# Patient Record
Sex: FEMALE | Race: WHITE | Employment: OTHER | URBAN - METROPOLITAN AREA
[De-identification: names, ages, dates, MRNs, and addresses within clinical notes are randomized per-mention and may not be internally consistent; named-entity substitution may affect disease eponyms.]

---

## 2017-01-10 ENCOUNTER — OFFICE VISIT (OUTPATIENT)
Dept: DERMATOLOGY | Age: 62
End: 2017-01-10

## 2017-01-10 ENCOUNTER — TELEPHONE (OUTPATIENT)
Dept: DERMATOLOGY | Age: 62
End: 2017-01-10

## 2017-01-10 DIAGNOSIS — L57.0 AK (ACTINIC KERATOSIS): Primary | ICD-10-CM

## 2017-01-10 DIAGNOSIS — L82.1 SK (SEBORRHEIC KERATOSIS): ICD-10-CM

## 2017-01-10 PROCEDURE — 17004 DESTROY PREMAL LESIONS 15/>: CPT | Performed by: DERMATOLOGY

## 2017-01-10 PROCEDURE — 99202 OFFICE O/P NEW SF 15 MIN: CPT | Performed by: DERMATOLOGY

## 2017-01-10 RX ORDER — FLUOROURACIL 50 MG/G
CREAM TOPICAL
Qty: 40 G | Refills: 0 | Status: SHIPPED | OUTPATIENT
Start: 2017-01-10 | End: 2017-03-24

## 2017-02-20 ENCOUNTER — TELEPHONE (OUTPATIENT)
Dept: FAMILY MEDICINE CLINIC | Age: 62
End: 2017-02-20

## 2017-02-20 DIAGNOSIS — R79.89 ELEVATED LFTS: ICD-10-CM

## 2017-02-20 DIAGNOSIS — I10 ESSENTIAL HYPERTENSION: Primary | ICD-10-CM

## 2017-02-20 DIAGNOSIS — Z00.00 EXAMINATION, MEDICAL, GENERAL: ICD-10-CM

## 2017-03-24 ENCOUNTER — OFFICE VISIT (OUTPATIENT)
Dept: FAMILY MEDICINE CLINIC | Age: 62
End: 2017-03-24

## 2017-03-24 VITALS
WEIGHT: 194.25 LBS | RESPIRATION RATE: 16 BRPM | DIASTOLIC BLOOD PRESSURE: 70 MMHG | OXYGEN SATURATION: 97 % | BODY MASS INDEX: 31.12 KG/M2 | SYSTOLIC BLOOD PRESSURE: 110 MMHG | HEART RATE: 86 BPM

## 2017-03-24 DIAGNOSIS — F63.0: ICD-10-CM

## 2017-03-24 DIAGNOSIS — F33.42 RECURRENT MAJOR DEPRESSIVE DISORDER, IN FULL REMISSION (HCC): ICD-10-CM

## 2017-03-24 DIAGNOSIS — J01.90 ACUTE NON-RECURRENT SINUSITIS, UNSPECIFIED LOCATION: ICD-10-CM

## 2017-03-24 DIAGNOSIS — I10 ESSENTIAL HYPERTENSION: Primary | ICD-10-CM

## 2017-03-24 DIAGNOSIS — F10.20 ALCOHOLISM (HCC): ICD-10-CM

## 2017-03-24 DIAGNOSIS — R73.9 HYPERGLYCEMIA: ICD-10-CM

## 2017-03-24 PROBLEM — F32.5 MAJOR DEPRESSIVE DISORDER IN FULL REMISSION (HCC): Status: ACTIVE | Noted: 2017-03-24

## 2017-03-24 PROCEDURE — 99214 OFFICE O/P EST MOD 30 MIN: CPT | Performed by: FAMILY MEDICINE

## 2017-03-24 RX ORDER — FLUOROURACIL 50 MG/G
CREAM TOPICAL
Qty: 40 G | Refills: 0 | Status: CANCELLED | OUTPATIENT
Start: 2017-03-24

## 2017-03-24 RX ORDER — AMLODIPINE BESYLATE 5 MG/1
5 TABLET ORAL DAILY
Qty: 90 TABLET | Refills: 3 | Status: SHIPPED | OUTPATIENT
Start: 2017-03-24 | End: 2019-02-18 | Stop reason: SDUPTHER

## 2017-03-24 RX ORDER — AMOXICILLIN 500 MG/1
500 CAPSULE ORAL 3 TIMES DAILY
Qty: 30 CAPSULE | Refills: 0 | Status: SHIPPED | OUTPATIENT
Start: 2017-03-24 | End: 2017-04-03

## 2017-03-24 RX ORDER — VENLAFAXINE HYDROCHLORIDE 150 MG/1
CAPSULE, EXTENDED RELEASE ORAL
Qty: 180 CAPSULE | Refills: 3 | Status: SHIPPED | OUTPATIENT
Start: 2017-03-24 | End: 2019-01-15 | Stop reason: ALTCHOICE

## 2017-03-24 RX ORDER — OMEPRAZOLE 40 MG/1
40 CAPSULE, DELAYED RELEASE ORAL DAILY
Qty: 90 CAPSULE | Refills: 3 | Status: SHIPPED | OUTPATIENT
Start: 2017-03-24 | End: 2019-02-18 | Stop reason: SDUPTHER

## 2017-03-24 ASSESSMENT — ENCOUNTER SYMPTOMS
COUGH: 1
DIARRHEA: 0
SHORTNESS OF BREATH: 0
VOICE CHANGE: 1
CONSTIPATION: 0
BACK PAIN: 0
ABDOMINAL PAIN: 0

## 2017-04-13 ENCOUNTER — OFFICE VISIT (OUTPATIENT)
Dept: DERMATOLOGY | Age: 62
End: 2017-04-13

## 2017-04-13 DIAGNOSIS — L71.9 ROSACEA: ICD-10-CM

## 2017-04-13 DIAGNOSIS — L57.0 AK (ACTINIC KERATOSIS): Primary | ICD-10-CM

## 2017-04-13 PROCEDURE — 17003 DESTRUCT PREMALG LES 2-14: CPT | Performed by: DERMATOLOGY

## 2017-04-13 PROCEDURE — 17000 DESTRUCT PREMALG LESION: CPT | Performed by: DERMATOLOGY

## 2017-04-13 PROCEDURE — 99213 OFFICE O/P EST LOW 20 MIN: CPT | Performed by: DERMATOLOGY

## 2017-06-02 ENCOUNTER — OFFICE VISIT (OUTPATIENT)
Dept: FAMILY MEDICINE CLINIC | Age: 62
End: 2017-06-02

## 2017-06-02 VITALS
TEMPERATURE: 99.6 F | HEART RATE: 92 BPM | BODY MASS INDEX: 31.14 KG/M2 | OXYGEN SATURATION: 98 % | DIASTOLIC BLOOD PRESSURE: 84 MMHG | SYSTOLIC BLOOD PRESSURE: 126 MMHG | WEIGHT: 194.4 LBS

## 2017-06-02 DIAGNOSIS — R05.3 CHRONIC COUGH: Primary | ICD-10-CM

## 2017-06-02 PROCEDURE — 99213 OFFICE O/P EST LOW 20 MIN: CPT | Performed by: PHYSICIAN ASSISTANT

## 2017-06-02 RX ORDER — LEVOFLOXACIN 500 MG/1
500 TABLET, FILM COATED ORAL DAILY
Qty: 10 TABLET | Refills: 0 | Status: SHIPPED | OUTPATIENT
Start: 2017-06-02 | End: 2017-06-09 | Stop reason: SDUPTHER

## 2017-06-02 ASSESSMENT — ENCOUNTER SYMPTOMS
WHEEZING: 0
DIARRHEA: 0
NAUSEA: 0
SHORTNESS OF BREATH: 0
SINUS PRESSURE: 0
TROUBLE SWALLOWING: 0
VOMITING: 0
SORE THROAT: 1
RHINORRHEA: 0
COUGH: 1

## 2017-06-09 ENCOUNTER — TELEPHONE (OUTPATIENT)
Dept: FAMILY MEDICINE CLINIC | Age: 62
End: 2017-06-09

## 2017-06-09 DIAGNOSIS — R05.3 CHRONIC COUGH: ICD-10-CM

## 2017-06-09 RX ORDER — LEVOFLOXACIN 500 MG/1
500 TABLET, FILM COATED ORAL DAILY
Qty: 4 TABLET | Refills: 0 | Status: SHIPPED | OUTPATIENT
Start: 2017-06-09 | End: 2017-06-13

## 2017-06-13 ENCOUNTER — HOSPITAL ENCOUNTER (OUTPATIENT)
Dept: OTHER | Age: 62
Discharge: HOME | End: 2017-06-13
Attending: FAMILY MEDICINE | Admitting: FAMILY MEDICINE

## 2017-06-13 DIAGNOSIS — R05.3 CHRONIC COUGH: ICD-10-CM

## 2017-12-15 ENCOUNTER — TELEPHONE (OUTPATIENT)
Dept: OTHER | Facility: CLINIC | Age: 62
End: 2017-12-15

## 2018-01-10 ENCOUNTER — OFFICE VISIT (OUTPATIENT)
Dept: DERMATOLOGY | Age: 63
End: 2018-01-10

## 2018-01-10 DIAGNOSIS — L57.0 AK (ACTINIC KERATOSIS): Primary | ICD-10-CM

## 2018-01-10 PROCEDURE — 17003 DESTRUCT PREMALG LES 2-14: CPT | Performed by: DERMATOLOGY

## 2018-01-10 PROCEDURE — 17000 DESTRUCT PREMALG LESION: CPT | Performed by: DERMATOLOGY

## 2018-01-23 ENCOUNTER — TELEPHONE (OUTPATIENT)
Dept: FAMILY MEDICINE CLINIC | Age: 63
End: 2018-01-23

## 2018-01-23 DIAGNOSIS — I10 ESSENTIAL HYPERTENSION: Primary | ICD-10-CM

## 2018-01-23 DIAGNOSIS — R73.9 HYPERGLYCEMIA: ICD-10-CM

## 2018-01-23 DIAGNOSIS — E78.00 ELEVATED CHOLESTEROL: ICD-10-CM

## 2018-05-04 ENCOUNTER — TELEPHONE (OUTPATIENT)
Dept: FAMILY MEDICINE CLINIC | Age: 63
End: 2018-05-04

## 2019-01-09 ENCOUNTER — OFFICE VISIT (OUTPATIENT)
Dept: DERMATOLOGY | Age: 64
End: 2019-01-09
Payer: MEDICARE

## 2019-01-09 DIAGNOSIS — L57.0 AK (ACTINIC KERATOSIS): Primary | ICD-10-CM

## 2019-01-09 PROCEDURE — 17003 DESTRUCT PREMALG LES 2-14: CPT | Performed by: DERMATOLOGY

## 2019-01-09 PROCEDURE — 1036F TOBACCO NON-USER: CPT | Performed by: DERMATOLOGY

## 2019-01-09 PROCEDURE — G8421 BMI NOT CALCULATED: HCPCS | Performed by: DERMATOLOGY

## 2019-01-09 PROCEDURE — 17000 DESTRUCT PREMALG LESION: CPT | Performed by: DERMATOLOGY

## 2019-01-09 PROCEDURE — 99213 OFFICE O/P EST LOW 20 MIN: CPT | Performed by: DERMATOLOGY

## 2019-01-09 PROCEDURE — G8484 FLU IMMUNIZE NO ADMIN: HCPCS | Performed by: DERMATOLOGY

## 2019-01-09 PROCEDURE — G8427 DOCREV CUR MEDS BY ELIG CLIN: HCPCS | Performed by: DERMATOLOGY

## 2019-01-09 PROCEDURE — 3017F COLORECTAL CA SCREEN DOC REV: CPT | Performed by: DERMATOLOGY

## 2019-01-15 ENCOUNTER — OFFICE VISIT (OUTPATIENT)
Dept: INTERNAL MEDICINE CLINIC | Age: 64
End: 2019-01-15
Payer: MEDICARE

## 2019-01-15 VITALS
BODY MASS INDEX: 34.02 KG/M2 | HEIGHT: 65 IN | RESPIRATION RATE: 14 BRPM | WEIGHT: 204.2 LBS | SYSTOLIC BLOOD PRESSURE: 130 MMHG | DIASTOLIC BLOOD PRESSURE: 74 MMHG | HEART RATE: 88 BPM

## 2019-01-15 DIAGNOSIS — J30.89 ENVIRONMENTAL AND SEASONAL ALLERGIES: ICD-10-CM

## 2019-01-15 DIAGNOSIS — F51.04 PSYCHOPHYSIOLOGICAL INSOMNIA: ICD-10-CM

## 2019-01-15 DIAGNOSIS — K27.9 PUD (PEPTIC ULCER DISEASE): Chronic | ICD-10-CM

## 2019-01-15 DIAGNOSIS — Z87.09 HISTORY OF ASTHMA: ICD-10-CM

## 2019-01-15 DIAGNOSIS — I10 ESSENTIAL HYPERTENSION: ICD-10-CM

## 2019-01-15 DIAGNOSIS — R05.3 PERSISTENT COUGH: ICD-10-CM

## 2019-01-15 DIAGNOSIS — Z13.31 POSITIVE DEPRESSION SCREENING: ICD-10-CM

## 2019-01-15 DIAGNOSIS — F43.21 GRIEF: ICD-10-CM

## 2019-01-15 DIAGNOSIS — F10.20 ALCOHOLISM (HCC): ICD-10-CM

## 2019-01-15 DIAGNOSIS — F33.41 MAJOR DEPRESSIVE DISORDER, RECURRENT EPISODE, IN PARTIAL REMISSION (HCC): Primary | ICD-10-CM

## 2019-01-15 DIAGNOSIS — F63.0: ICD-10-CM

## 2019-01-15 PROCEDURE — G8431 POS CLIN DEPRES SCRN F/U DOC: HCPCS | Performed by: INTERNAL MEDICINE

## 2019-01-15 PROCEDURE — G8417 CALC BMI ABV UP PARAM F/U: HCPCS | Performed by: INTERNAL MEDICINE

## 2019-01-15 PROCEDURE — 1036F TOBACCO NON-USER: CPT | Performed by: INTERNAL MEDICINE

## 2019-01-15 PROCEDURE — 3017F COLORECTAL CA SCREEN DOC REV: CPT | Performed by: INTERNAL MEDICINE

## 2019-01-15 PROCEDURE — G8427 DOCREV CUR MEDS BY ELIG CLIN: HCPCS | Performed by: INTERNAL MEDICINE

## 2019-01-15 PROCEDURE — 99203 OFFICE O/P NEW LOW 30 MIN: CPT | Performed by: INTERNAL MEDICINE

## 2019-01-15 PROCEDURE — G0444 DEPRESSION SCREEN ANNUAL: HCPCS | Performed by: INTERNAL MEDICINE

## 2019-01-15 PROCEDURE — G8484 FLU IMMUNIZE NO ADMIN: HCPCS | Performed by: INTERNAL MEDICINE

## 2019-01-15 RX ORDER — CETIRIZINE HYDROCHLORIDE 10 MG/1
10 TABLET ORAL DAILY
Qty: 30 TABLET | Refills: 5 | Status: SHIPPED | OUTPATIENT
Start: 2019-01-15 | End: 2019-02-18 | Stop reason: SDUPTHER

## 2019-01-15 RX ORDER — ZOLPIDEM TARTRATE 10 MG/1
10 TABLET ORAL NIGHTLY PRN
COMMUNITY
End: 2019-07-10 | Stop reason: CLARIF

## 2019-01-15 RX ORDER — DULOXETIN HYDROCHLORIDE 30 MG/1
60 CAPSULE, DELAYED RELEASE ORAL DAILY
Qty: 30 CAPSULE | Refills: 1 | Status: SHIPPED | OUTPATIENT
Start: 2019-01-15 | End: 2019-01-15 | Stop reason: SDUPTHER

## 2019-01-15 RX ORDER — DULOXETIN HYDROCHLORIDE 60 MG/1
60 CAPSULE, DELAYED RELEASE ORAL DAILY
Qty: 30 CAPSULE | Refills: 1 | Status: SHIPPED | OUTPATIENT
Start: 2019-01-15 | End: 2019-01-23 | Stop reason: SDUPTHER

## 2019-01-15 RX ORDER — DULOXETIN HYDROCHLORIDE 30 MG/1
30 CAPSULE, DELAYED RELEASE ORAL DAILY
COMMUNITY
End: 2019-01-15 | Stop reason: SDUPTHER

## 2019-01-15 RX ORDER — ALBUTEROL SULFATE 90 UG/1
2 AEROSOL, METERED RESPIRATORY (INHALATION) 4 TIMES DAILY PRN
Qty: 1 INHALER | Refills: 0 | Status: SHIPPED | OUTPATIENT
Start: 2019-01-15

## 2019-01-15 RX ORDER — FLUTICASONE PROPIONATE 50 MCG
2 SPRAY, SUSPENSION (ML) NASAL DAILY
Qty: 1 BOTTLE | Refills: 2 | Status: SHIPPED | OUTPATIENT
Start: 2019-01-15 | End: 2019-02-18 | Stop reason: SDUPTHER

## 2019-01-15 ASSESSMENT — PATIENT HEALTH QUESTIONNAIRE - PHQ9
SUM OF ALL RESPONSES TO PHQ QUESTIONS 1-9: 18
10. IF YOU CHECKED OFF ANY PROBLEMS, HOW DIFFICULT HAVE THESE PROBLEMS MADE IT FOR YOU TO DO YOUR WORK, TAKE CARE OF THINGS AT HOME, OR GET ALONG WITH OTHER PEOPLE: 1
SUM OF ALL RESPONSES TO PHQ9 QUESTIONS 1 & 2: 6
6. FEELING BAD ABOUT YOURSELF - OR THAT YOU ARE A FAILURE OR HAVE LET YOURSELF OR YOUR FAMILY DOWN: 2
1. LITTLE INTEREST OR PLEASURE IN DOING THINGS: 3
5. POOR APPETITE OR OVEREATING: 0
SUM OF ALL RESPONSES TO PHQ QUESTIONS 1-9: 18
9. THOUGHTS THAT YOU WOULD BE BETTER OFF DEAD, OR OF HURTING YOURSELF: 2
3. TROUBLE FALLING OR STAYING ASLEEP: 3
2. FEELING DOWN, DEPRESSED OR HOPELESS: 3
7. TROUBLE CONCENTRATING ON THINGS, SUCH AS READING THE NEWSPAPER OR WATCHING TELEVISION: 1
4. FEELING TIRED OR HAVING LITTLE ENERGY: 1
8. MOVING OR SPEAKING SO SLOWLY THAT OTHER PEOPLE COULD HAVE NOTICED. OR THE OPPOSITE, BEING SO FIGETY OR RESTLESS THAT YOU HAVE BEEN MOVING AROUND A LOT MORE THAN USUAL: 3

## 2019-01-15 ASSESSMENT — ENCOUNTER SYMPTOMS
CONSTIPATION: 0
SHORTNESS OF BREATH: 0
CHEST TIGHTNESS: 0
WHEEZING: 0
DIARRHEA: 0
COUGH: 1

## 2019-01-23 ENCOUNTER — OFFICE VISIT (OUTPATIENT)
Dept: INTERNAL MEDICINE CLINIC | Age: 64
End: 2019-01-23
Payer: MEDICARE

## 2019-01-23 VITALS
HEART RATE: 84 BPM | WEIGHT: 206.4 LBS | SYSTOLIC BLOOD PRESSURE: 124 MMHG | BODY MASS INDEX: 34.08 KG/M2 | DIASTOLIC BLOOD PRESSURE: 68 MMHG | RESPIRATION RATE: 12 BRPM

## 2019-01-23 DIAGNOSIS — Z78.9 ALCOHOL USE: ICD-10-CM

## 2019-01-23 DIAGNOSIS — Z01.419 WOMEN'S ANNUAL ROUTINE GYNECOLOGICAL EXAMINATION: Primary | ICD-10-CM

## 2019-01-23 DIAGNOSIS — N95.2 ATROPHIC VULVOVAGINITIS: ICD-10-CM

## 2019-01-23 DIAGNOSIS — R05.3 PERSISTENT COUGH: ICD-10-CM

## 2019-01-23 DIAGNOSIS — F33.41 MAJOR DEPRESSIVE DISORDER, RECURRENT EPISODE, IN PARTIAL REMISSION (HCC): ICD-10-CM

## 2019-01-23 DIAGNOSIS — I10 ESSENTIAL HYPERTENSION: ICD-10-CM

## 2019-01-23 DIAGNOSIS — E55.9 VITAMIN D INSUFFICIENCY: ICD-10-CM

## 2019-01-23 DIAGNOSIS — Z12.11 ENCOUNTER FOR SCREENING FECAL OCCULT BLOOD TESTING: ICD-10-CM

## 2019-01-23 LAB
HEMOCCULT STL QL: NEGATIVE
HEMOCCULT STL QL: NEGATIVE
HEMOCCULT STL QL: NORMAL

## 2019-01-23 PROCEDURE — 99396 PREV VISIT EST AGE 40-64: CPT | Performed by: INTERNAL MEDICINE

## 2019-01-23 PROCEDURE — G8484 FLU IMMUNIZE NO ADMIN: HCPCS | Performed by: INTERNAL MEDICINE

## 2019-01-23 PROCEDURE — 82270 OCCULT BLOOD FECES: CPT | Performed by: INTERNAL MEDICINE

## 2019-01-23 RX ORDER — DULOXETIN HYDROCHLORIDE 60 MG/1
60 CAPSULE, DELAYED RELEASE ORAL DAILY
Qty: 30 CAPSULE | Refills: 5 | Status: SHIPPED | OUTPATIENT
Start: 2019-01-23 | End: 2019-02-18 | Stop reason: SDUPTHER

## 2019-01-23 ASSESSMENT — ENCOUNTER SYMPTOMS
WHEEZING: 0
DIARRHEA: 0
VOMITING: 0
ABDOMINAL PAIN: 0
NAUSEA: 0
CONSTIPATION: 0
SHORTNESS OF BREATH: 0
BLOOD IN STOOL: 0
CHEST TIGHTNESS: 0

## 2019-01-28 ENCOUNTER — HOSPITAL ENCOUNTER (OUTPATIENT)
Dept: GENERAL RADIOLOGY | Age: 64
Discharge: HOME | End: 2019-01-28
Payer: MEDICARE

## 2019-01-28 ENCOUNTER — HOSPITAL ENCOUNTER (OUTPATIENT)
Age: 64
Discharge: HOME | End: 2019-01-28
Payer: MEDICARE

## 2019-01-28 DIAGNOSIS — R05.3 PERSISTENT COUGH: ICD-10-CM

## 2019-01-28 PROCEDURE — 71046 X-RAY EXAM CHEST 2 VIEWS: CPT

## 2019-01-30 ENCOUNTER — TELEPHONE (OUTPATIENT)
Dept: INTERNAL MEDICINE CLINIC | Age: 64
End: 2019-01-30

## 2019-01-30 DIAGNOSIS — N95.2 ATROPHIC VULVOVAGINITIS: ICD-10-CM

## 2019-01-31 RX ORDER — CLOBETASOL PROPIONATE 0.5 MG/G
CREAM TOPICAL
Qty: 30 G | Refills: 1 | Status: SHIPPED | OUTPATIENT
Start: 2019-01-31 | End: 2019-07-10 | Stop reason: CLARIF

## 2019-02-01 ENCOUNTER — TELEPHONE (OUTPATIENT)
Dept: INTERNAL MEDICINE CLINIC | Age: 64
End: 2019-02-01

## 2019-02-02 PROBLEM — Z01.419 WOMEN'S ANNUAL ROUTINE GYNECOLOGICAL EXAMINATION: Status: ACTIVE | Noted: 2019-02-02

## 2019-02-18 ENCOUNTER — TELEPHONE (OUTPATIENT)
Dept: INTERNAL MEDICINE CLINIC | Age: 64
End: 2019-02-18

## 2019-02-18 DIAGNOSIS — F33.41 MAJOR DEPRESSIVE DISORDER, RECURRENT EPISODE, IN PARTIAL REMISSION (HCC): ICD-10-CM

## 2019-02-18 DIAGNOSIS — J30.89 ENVIRONMENTAL AND SEASONAL ALLERGIES: ICD-10-CM

## 2019-02-18 RX ORDER — CETIRIZINE HYDROCHLORIDE 10 MG/1
10 TABLET ORAL DAILY
Qty: 90 TABLET | Refills: 1 | Status: SHIPPED | OUTPATIENT
Start: 2019-02-18 | End: 2019-08-19 | Stop reason: SDUPTHER

## 2019-02-18 RX ORDER — OMEPRAZOLE 40 MG/1
40 CAPSULE, DELAYED RELEASE ORAL DAILY
Qty: 90 CAPSULE | Refills: 1 | Status: SHIPPED | OUTPATIENT
Start: 2019-02-18 | End: 2019-08-19 | Stop reason: SDUPTHER

## 2019-02-18 RX ORDER — FLUTICASONE PROPIONATE 50 MCG
2 SPRAY, SUSPENSION (ML) NASAL DAILY
Qty: 1 BOTTLE | Refills: 2 | Status: SHIPPED | OUTPATIENT
Start: 2019-02-18 | End: 2019-07-08

## 2019-02-18 RX ORDER — AMLODIPINE BESYLATE 5 MG/1
5 TABLET ORAL DAILY
Qty: 90 TABLET | Refills: 1 | Status: SHIPPED | OUTPATIENT
Start: 2019-02-18 | End: 2019-08-19 | Stop reason: SDUPTHER

## 2019-02-18 RX ORDER — DULOXETIN HYDROCHLORIDE 60 MG/1
60 CAPSULE, DELAYED RELEASE ORAL DAILY
Qty: 90 CAPSULE | Refills: 1 | Status: SHIPPED | OUTPATIENT
Start: 2019-02-18 | End: 2019-07-08 | Stop reason: SDUPTHER

## 2019-03-04 PROBLEM — Z01.419 WOMEN'S ANNUAL ROUTINE GYNECOLOGICAL EXAMINATION: Status: RESOLVED | Noted: 2019-02-02 | Resolved: 2019-03-04

## 2019-07-08 ENCOUNTER — OFFICE VISIT (OUTPATIENT)
Dept: INTERNAL MEDICINE CLINIC | Age: 64
End: 2019-07-08
Payer: MEDICARE

## 2019-07-08 VITALS
BODY MASS INDEX: 34.12 KG/M2 | HEART RATE: 86 BPM | SYSTOLIC BLOOD PRESSURE: 116 MMHG | DIASTOLIC BLOOD PRESSURE: 88 MMHG | OXYGEN SATURATION: 96 % | WEIGHT: 206.6 LBS

## 2019-07-08 DIAGNOSIS — I10 ESSENTIAL HYPERTENSION: ICD-10-CM

## 2019-07-08 DIAGNOSIS — R05.3 PERSISTENT COUGH: ICD-10-CM

## 2019-07-08 DIAGNOSIS — F33.1 MODERATE EPISODE OF RECURRENT MAJOR DEPRESSIVE DISORDER (HCC): Primary | ICD-10-CM

## 2019-07-08 DIAGNOSIS — F10.20 ALCOHOLISM (HCC): ICD-10-CM

## 2019-07-08 DIAGNOSIS — E55.9 VITAMIN D INSUFFICIENCY: ICD-10-CM

## 2019-07-08 DIAGNOSIS — F63.0 ADDICTIVE GAMBLING: ICD-10-CM

## 2019-07-08 DIAGNOSIS — Z78.9 ALCOHOL USE: ICD-10-CM

## 2019-07-08 DIAGNOSIS — R53.83 FATIGUE, UNSPECIFIED TYPE: ICD-10-CM

## 2019-07-08 DIAGNOSIS — R06.83 SNORING: ICD-10-CM

## 2019-07-08 LAB
BASOPHILS ABSOLUTE: 0.1 K/UL (ref 0–0.2)
BASOPHILS RELATIVE PERCENT: 0.9 %
EOSINOPHILS ABSOLUTE: 0.5 K/UL (ref 0–0.6)
EOSINOPHILS RELATIVE PERCENT: 8.4 %
HCT VFR BLD CALC: 42.6 % (ref 36–48)
HEMOGLOBIN: 14.2 G/DL (ref 12–16)
LYMPHOCYTES ABSOLUTE: 1.1 K/UL (ref 1–5.1)
LYMPHOCYTES RELATIVE PERCENT: 20.5 %
MCH RBC QN AUTO: 34.8 PG (ref 26–34)
MCHC RBC AUTO-ENTMCNC: 33.3 G/DL (ref 31–36)
MCV RBC AUTO: 104.6 FL (ref 80–100)
MONOCYTES ABSOLUTE: 0.4 K/UL (ref 0–1.3)
MONOCYTES RELATIVE PERCENT: 7.4 %
NEUTROPHILS ABSOLUTE: 3.5 K/UL (ref 1.7–7.7)
NEUTROPHILS RELATIVE PERCENT: 62.8 %
PDW BLD-RTO: 13.6 % (ref 12.4–15.4)
PLATELET # BLD: 271 K/UL (ref 135–450)
PMV BLD AUTO: 8.1 FL (ref 5–10.5)
RBC # BLD: 4.08 M/UL (ref 4–5.2)
TSH REFLEX: 1.51 UIU/ML (ref 0.27–4.2)
WBC # BLD: 5.6 K/UL (ref 4–11)

## 2019-07-08 PROCEDURE — 3017F COLORECTAL CA SCREEN DOC REV: CPT | Performed by: INTERNAL MEDICINE

## 2019-07-08 PROCEDURE — 3014F SCREEN MAMMO DOC REV: CPT | Performed by: INTERNAL MEDICINE

## 2019-07-08 PROCEDURE — 99214 OFFICE O/P EST MOD 30 MIN: CPT | Performed by: INTERNAL MEDICINE

## 2019-07-08 PROCEDURE — G8417 CALC BMI ABV UP PARAM F/U: HCPCS | Performed by: INTERNAL MEDICINE

## 2019-07-08 PROCEDURE — 1036F TOBACCO NON-USER: CPT | Performed by: INTERNAL MEDICINE

## 2019-07-08 PROCEDURE — G8427 DOCREV CUR MEDS BY ELIG CLIN: HCPCS | Performed by: INTERNAL MEDICINE

## 2019-07-08 RX ORDER — AZELASTINE 1 MG/ML
2 SPRAY, METERED NASAL 2 TIMES DAILY
Qty: 4 BOTTLE | Refills: 1 | Status: SHIPPED | OUTPATIENT
Start: 2019-07-08

## 2019-07-08 RX ORDER — DULOXETIN HYDROCHLORIDE 60 MG/1
120 CAPSULE, DELAYED RELEASE ORAL DAILY
Qty: 180 CAPSULE | Refills: 1 | Status: SHIPPED | OUTPATIENT
Start: 2019-07-08 | End: 2019-07-10 | Stop reason: CLARIF

## 2019-07-08 RX ORDER — FLUTICASONE PROPIONATE 110 UG/1
2 AEROSOL, METERED RESPIRATORY (INHALATION) 2 TIMES DAILY
Qty: 1 INHALER | Refills: 3 | Status: SHIPPED | OUTPATIENT
Start: 2019-07-08 | End: 2020-07-07

## 2019-07-08 ASSESSMENT — ENCOUNTER SYMPTOMS
COUGH: 1
CONSTIPATION: 0
DIARRHEA: 0
CHEST TIGHTNESS: 0
SHORTNESS OF BREATH: 0

## 2019-07-08 NOTE — PROGRESS NOTES
back surgery    Diabetes Brother     Sudden Death Sister         cardiac arrest        Outpatient Medications Prior to Visit   Medication Sig Dispense Refill    cetirizine (ZYRTEC) 10 MG tablet Take 1 tablet by mouth daily 90 tablet 1    amLODIPine (NORVASC) 5 MG tablet Take 1 tablet by mouth daily 90 tablet 1    omeprazole (PRILOSEC) 40 MG delayed release capsule Take 1 capsule by mouth daily 90 capsule 1    clobetasol (TEMOVATE) 0.05 % cream Apply thin layer BID prn itching 30 g 1    zolpidem (AMBIEN) 10 MG tablet Take 10 mg by mouth nightly as needed for Sleep. Inis Ducking acetaminophen (TYLENOL) 500 MG tablet Take 500 mg by mouth every 6 hours as needed for Pain As needed      DULoxetine (CYMBALTA) 60 MG extended release capsule Take 1 capsule by mouth daily 90 capsule 1    fluticasone (FLONASE) 50 MCG/ACT nasal spray 2 sprays by Each Nare route daily 1 Bottle 2    albuterol sulfate  (90 Base) MCG/ACT inhaler Inhale 2 puffs into the lungs 4 times daily as needed for Wheezing 1 Inhaler 0     No facility-administered medications prior to visit. Patient'spast medical history, surgical history, family history, medications,  and allergies  were all reviewed and updated as appropriate today. Review of Systems   Constitutional: Positive for fatigue. Negative for appetite change and fever. Respiratory: Positive for cough. Negative for chest tightness and shortness of breath. Cardiovascular: Negative for chest pain. Gastrointestinal: Negative for constipation and diarrhea. Skin: Negative for rash. Psychiatric/Behavioral: Positive for dysphoric mood and sleep disturbance. /88   Pulse 86   Wt 206 lb 9.6 oz (93.7 kg)   SpO2 96%   BMI 34.12 kg/m²   Physical Exam   Constitutional: She appears well-developed and well-nourished. She is cooperative. Non-toxic appearance. HENT:   Head: Normocephalic.    Right Ear: Tympanic membrane, external ear and ear canal normal.   Left Ear: mildly elevated. Suspect this relates to her alcoholism as well as untreated obstructive sleep apnea. Moderate episode of recurrent major depressive disorder [facility]) - Primary     Patient states that she has been depressed for \"my entire life\". She has been on several different antidepressants over the past several years. She does not feel it any have worked really well. At this time, will increase Cymbalta to 120 mg daily and refer to Dr. Jalil Padilla and Dr. Vanessa Laws. We did discuss gene site testing and she will check with her insurance to see if this is covered as well as check with gene site to see how much it will cost her if it is not covered. Relevant Medications    DULoxetine (CYMBALTA) 60 MG extended release capsule    Other Relevant Orders    External Referral To Psychology    External Referral to Psychiatry    Persistent cough     Patient states that she has been worked up for this in the past.  She has had several chest x-rays that have been negative. She has seen ENT. She does take omeprazole. She did not find Flonase to be helpful. She does have a history of asthma. She has never smoked. Add Flovent 110 mcg 2 puffs twice daily. Add Astelin nose spray as she is noted to be snorting in her office visit and I suspect a lot of this cough relates to upper airway drainage. Relevant Medications    fluticasone (FLOVENT HFA) 110 MCG/ACT inhaler    azelastine (ASTELIN) 0.1 % nasal spray    Snoring     Suspect obstructive sleep apnea. Referral given to sleep medicine.          Relevant Orders    Rosario Quezada MD, Sleep Medicine, [facility]          Current Outpatient Medications   Medication Sig Dispense Refill    fluticasone (FLOVENT HFA) 110 MCG/ACT inhaler Inhale 2 puffs into the lungs 2 times daily 1 Inhaler 3    azelastine (ASTELIN) 0.1 % nasal spray 2 sprays by Nasal route 2 times daily Use in each nostril as directed 4 Bottle 1    DULoxetine (CYMBALTA) 60 MG

## 2019-07-09 ENCOUNTER — TELEPHONE (OUTPATIENT)
Dept: INTERNAL MEDICINE CLINIC | Age: 64
End: 2019-07-09

## 2019-07-09 LAB
A/G RATIO: 1.6 (ref 1.1–2.2)
ALBUMIN SERPL-MCNC: 4.7 G/DL (ref 3.4–5)
ALP BLD-CCNC: 105 U/L (ref 40–129)
ALT SERPL-CCNC: 46 U/L (ref 10–40)
ANION GAP SERPL CALCULATED.3IONS-SCNC: 19 MMOL/L (ref 3–16)
AST SERPL-CCNC: 41 U/L (ref 15–37)
BILIRUB SERPL-MCNC: 0.6 MG/DL (ref 0–1)
BUN BLDV-MCNC: 15 MG/DL (ref 7–20)
CALCIUM SERPL-MCNC: 10.2 MG/DL (ref 8.3–10.6)
CHLORIDE BLD-SCNC: 102 MMOL/L (ref 99–110)
CHOLESTEROL, TOTAL: 263 MG/DL (ref 0–199)
CO2: 22 MMOL/L (ref 21–32)
CREAT SERPL-MCNC: 0.6 MG/DL (ref 0.6–1.2)
FOLATE: 6.25 NG/ML (ref 4.78–24.2)
GFR AFRICAN AMERICAN: >60
GFR NON-AFRICAN AMERICAN: >60
GLOBULIN: 2.9 G/DL
GLUCOSE BLD-MCNC: 111 MG/DL (ref 70–99)
HDLC SERPL-MCNC: 89 MG/DL (ref 40–60)
LDL CHOLESTEROL CALCULATED: 153 MG/DL
POTASSIUM SERPL-SCNC: 4.4 MMOL/L (ref 3.5–5.1)
SODIUM BLD-SCNC: 143 MMOL/L (ref 136–145)
TOTAL PROTEIN: 7.6 G/DL (ref 6.4–8.2)
TRIGL SERPL-MCNC: 107 MG/DL (ref 0–150)
VITAMIN B-12: 379 PG/ML (ref 211–911)
VITAMIN D 25-HYDROXY: 31.2 NG/ML
VLDLC SERPL CALC-MCNC: 21 MG/DL

## 2019-07-09 NOTE — TELEPHONE ENCOUNTER
Pt's  said Dr Malena Aranda is out of their network. He spoke w/[employer] and was told she could possibly be seen if a pre auth was done.  She is scheduled for tomorrow morning so the pre auth would need to be done ASAP

## 2019-07-10 ENCOUNTER — OFFICE VISIT (OUTPATIENT)
Dept: PSYCHIATRY | Age: 64
End: 2019-07-10
Payer: MEDICARE

## 2019-07-10 VITALS
DIASTOLIC BLOOD PRESSURE: 78 MMHG | SYSTOLIC BLOOD PRESSURE: 120 MMHG | HEART RATE: 78 BPM | HEIGHT: 67 IN | WEIGHT: 209.8 LBS | BODY MASS INDEX: 32.93 KG/M2

## 2019-07-10 DIAGNOSIS — F63.0 GAMBLING DISORDER, MODERATE: Primary | ICD-10-CM

## 2019-07-10 DIAGNOSIS — F10.20 ALCOHOL USE DISORDER, MODERATE, DEPENDENCE (HCC): ICD-10-CM

## 2019-07-10 DIAGNOSIS — F33.1 MODERATE EPISODE OF RECURRENT MAJOR DEPRESSIVE DISORDER (HCC): ICD-10-CM

## 2019-07-10 PROCEDURE — 99214 OFFICE O/P EST MOD 30 MIN: CPT | Performed by: PSYCHIATRY & NEUROLOGY

## 2019-07-10 RX ORDER — DULOXETIN HYDROCHLORIDE 60 MG/1
60 CAPSULE, DELAYED RELEASE ORAL DAILY
Qty: 90 CAPSULE | Refills: 0 | Status: SHIPPED | OUTPATIENT
Start: 2019-07-10 | End: 2019-08-19 | Stop reason: SDUPTHER

## 2019-07-10 ASSESSMENT — PATIENT HEALTH QUESTIONNAIRE - PHQ9
2. FEELING DOWN, DEPRESSED OR HOPELESS: 3
7. TROUBLE CONCENTRATING ON THINGS, SUCH AS READING THE NEWSPAPER OR WATCHING TELEVISION: 3
3. TROUBLE FALLING OR STAYING ASLEEP: 3
1. LITTLE INTEREST OR PLEASURE IN DOING THINGS: 3
6. FEELING BAD ABOUT YOURSELF - OR THAT YOU ARE A FAILURE OR HAVE LET YOURSELF OR YOUR FAMILY DOWN: 3
5. POOR APPETITE OR OVEREATING: 0
SUM OF ALL RESPONSES TO PHQ QUESTIONS 1-9: 20
SUM OF ALL RESPONSES TO PHQ QUESTIONS 1-9: 20
SUM OF ALL RESPONSES TO PHQ9 QUESTIONS 1 & 2: 6
9. THOUGHTS THAT YOU WOULD BE BETTER OFF DEAD, OR OF HURTING YOURSELF: 3
10. IF YOU CHECKED OFF ANY PROBLEMS, HOW DIFFICULT HAVE THESE PROBLEMS MADE IT FOR YOU TO DO YOUR WORK, TAKE CARE OF THINGS AT HOME, OR GET ALONG WITH OTHER PEOPLE: 2
4. FEELING TIRED OR HAVING LITTLE ENERGY: 1
8. MOVING OR SPEAKING SO SLOWLY THAT OTHER PEOPLE COULD HAVE NOTICED. OR THE OPPOSITE, BEING SO FIGETY OR RESTLESS THAT YOU HAVE BEEN MOVING AROUND A LOT MORE THAN USUAL: 1

## 2019-07-10 ASSESSMENT — ANXIETY QUESTIONNAIRES
7. FEELING AFRAID AS IF SOMETHING AWFUL MIGHT HAPPEN: 3-NEARLY EVERY DAY
GAD7 TOTAL SCORE: 21
4. TROUBLE RELAXING: 3-NEARLY EVERY DAY
5. BEING SO RESTLESS THAT IT IS HARD TO SIT STILL: 3-NEARLY EVERY DAY
1. FEELING NERVOUS, ANXIOUS, OR ON EDGE: 3-NEARLY EVERY DAY
2. NOT BEING ABLE TO STOP OR CONTROL WORRYING: 3-NEARLY EVERY DAY
3. WORRYING TOO MUCH ABOUT DIFFERENT THINGS: 3-NEARLY EVERY DAY
6. BECOMING EASILY ANNOYED OR IRRITABLE: 3-NEARLY EVERY DAY
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
## 2019-07-24 ENCOUNTER — OFFICE VISIT (OUTPATIENT)
Dept: PSYCHOLOGY | Age: 64
End: 2019-07-24
Payer: MEDICARE

## 2019-07-24 DIAGNOSIS — F63.0 ADDICTIVE GAMBLING: ICD-10-CM

## 2019-07-24 DIAGNOSIS — F10.20 ALCOHOL USE DISORDER, MODERATE, DEPENDENCE (HCC): ICD-10-CM

## 2019-07-24 DIAGNOSIS — F33.1 MODERATE EPISODE OF RECURRENT MAJOR DEPRESSIVE DISORDER (HCC): Primary | ICD-10-CM

## 2019-07-24 PROCEDURE — 90791 PSYCH DIAGNOSTIC EVALUATION: CPT | Performed by: PSYCHOLOGIST

## 2019-07-24 ASSESSMENT — PATIENT HEALTH QUESTIONNAIRE - PHQ9
2. FEELING DOWN, DEPRESSED OR HOPELESS: 2
5. POOR APPETITE OR OVEREATING: 0
SUM OF ALL RESPONSES TO PHQ9 QUESTIONS 1 & 2: 4
6. FEELING BAD ABOUT YOURSELF - OR THAT YOU ARE A FAILURE OR HAVE LET YOURSELF OR YOUR FAMILY DOWN: 2
7. TROUBLE CONCENTRATING ON THINGS, SUCH AS READING THE NEWSPAPER OR WATCHING TELEVISION: 3
1. LITTLE INTEREST OR PLEASURE IN DOING THINGS: 2
10. IF YOU CHECKED OFF ANY PROBLEMS, HOW DIFFICULT HAVE THESE PROBLEMS MADE IT FOR YOU TO DO YOUR WORK, TAKE CARE OF THINGS AT HOME, OR GET ALONG WITH OTHER PEOPLE: 1
SUM OF ALL RESPONSES TO PHQ QUESTIONS 1-9: 14
8. MOVING OR SPEAKING SO SLOWLY THAT OTHER PEOPLE COULD HAVE NOTICED. OR THE OPPOSITE, BEING SO FIGETY OR RESTLESS THAT YOU HAVE BEEN MOVING AROUND A LOT MORE THAN USUAL: 0
3. TROUBLE FALLING OR STAYING ASLEEP: 2
SUM OF ALL RESPONSES TO PHQ QUESTIONS 1-9: 14
9. THOUGHTS THAT YOU WOULD BE BETTER OFF DEAD, OR OF HURTING YOURSELF: 1
4. FEELING TIRED OR HAVING LITTLE ENERGY: 2

## 2019-08-01 PROBLEM — F10.20 ALCOHOL USE DISORDER, MODERATE, DEPENDENCE (HCC): Status: ACTIVE | Noted: 2019-08-01

## 2019-08-19 DIAGNOSIS — J30.89 ENVIRONMENTAL AND SEASONAL ALLERGIES: ICD-10-CM

## 2019-08-19 RX ORDER — CETIRIZINE HYDROCHLORIDE 10 MG/1
TABLET ORAL
Qty: 90 TABLET | Refills: 1 | Status: SHIPPED | OUTPATIENT
Start: 2019-08-19

## 2019-08-19 RX ORDER — FLUTICASONE PROPIONATE 50 MCG
SPRAY, SUSPENSION (ML) NASAL
Qty: 48 G | Refills: 2 | Status: SHIPPED | OUTPATIENT
Start: 2019-08-19

## 2019-08-19 RX ORDER — OMEPRAZOLE 40 MG/1
CAPSULE, DELAYED RELEASE ORAL
Qty: 90 CAPSULE | Refills: 1 | Status: SHIPPED | OUTPATIENT
Start: 2019-08-19

## 2019-08-19 RX ORDER — AMLODIPINE BESYLATE 5 MG/1
TABLET ORAL
Qty: 90 TABLET | Refills: 1 | Status: SHIPPED | OUTPATIENT
Start: 2019-08-19

## 2019-08-19 RX ORDER — DULOXETIN HYDROCHLORIDE 60 MG/1
CAPSULE, DELAYED RELEASE ORAL
Qty: 90 CAPSULE | Refills: 1 | Status: SHIPPED | OUTPATIENT
Start: 2019-08-19

## 2021-03-08 NOTE — ASSESSMENT & PLAN NOTE
NEPHROLOGY CONSULTATION        HISTORY OF PRESENT ILLNESS    This is a 61-year-old male with a past medical history significant for type 2 diabetes, alcoholic cirrhosis, hypertension known to our service who was admitted for acute onset of rigors and weakness and fevers on Friday, consulted for acute kidney injury.  He had been admitted in January for recurrent UTI for which he completed an outpatient course of antibiotics.  We had seen him at that time due to acute kidney injury felt to be secondary to volume depletion which improved with IV fluids.  His hyponatremia at that time improved with IV hydration as well and his creatinine returned to his baseline 0.9.  Upon arrival to ER, there is concern for septic shock in the setting of newly diagnosed emphysematous pyelonephritis, he did not require vasopressors, started on broad-spectrum antibiotics, blood pressure improved with aggressive IV hydration.  Reports feeling better today, no nausea 1 fevers or chills shortness of breath or chest pain.  Creatinine did peak at 1.9, down to 1.6 this morning.  Reports that his appetite is returned to his baseline.  He was on an ACE inhibitor in outpatient setting which he was compliant with up until admission.  He has not received any recent IV contrast studies.  Currently no nausea vomiting fevers or chills shortness of breath or chest pain.  He is frustrated that he has had recurrent UTIs.  Denies NSAID use in the outpatient setting        PAST MEDICAL HISTORY    Past Medical History:   Diagnosis Date   • Chronic kidney disease    • Diabetes mellitus (CMS/HCC)    • Essential (primary) hypertension    • Gastroesophageal reflux disease    • Hyponatremia    • Liver cirrhosis (CMS/HCC)        PAST SURGICAL HISTORY    Past Surgical History:   Procedure Laterality Date   • Cholecystectomy         MEDICATIONS    Medications Prior to Admission   Medication Sig Dispense Refill   • pantoprazole (PROTONIX) 40 MG tablet Take 40 mg by  Patient states that she has been depressed for \"my entire life\". She has been on several different antidepressants over the past several years. She does not feel it any have worked really well. At this time, will increase Cymbalta to 120 mg daily and refer to Dr. Darryle Slater and Dr. Blanca Ashley. We did discuss gene site testing and she will check with her insurance to see if this is covered as well as check with gene site to see how much it will cost her if it is not covered. mouth nightly.      • propranolol (INDERAL) 10 MG tablet Take 10 mg by mouth nightly.      • insulin glargine (LANTUS) 100 UNIT/ML vial solution Inject 25 Units into the skin nightly. 10 mL 12   • insulin lispro 100 UNIT/ML injectable solution Inject 18 Units into the skin daily (with breakfast). Do not start before February 1, 2021. (Patient taking differently: Inject 19 Units into the skin daily (with breakfast). ) 10 mL 12   • insulin lispro 100 UNIT/ML injectable solution Inject 22 Units into the skin daily (before lunch). 10 mL 12   • insulin lispro 100 UNIT/ML injectable solution Inject 26 Units into the skin daily (with dinner). 10 mL 12   • insulin lispro 100 UNIT/ML scheduled dose AND correction dose Plz see the above sliding scale 1 Package 12   • Omega-3 Fatty Acids (Fish Oil) 1000 MG capsule Take 1 g by mouth nightly.      • therapeutic multivitamin-minerals (THERAGRAN-M) tablet Take 1 tablet by mouth nightly.      • glucosamine-chondroitin 500-400 MG tablet Take 2 tablets by mouth nightly.      • lisinopril (ZESTRIL) 10 MG tablet Take 10 mg by mouth nightly.      • ezetimibe (ZETIA) 10 MG tablet Take 10 mg by mouth nightly.          ALLERGIES    ALLERGIES:  Sulfa antibiotics    SOCIAL HISTORY   No smoking, drinking or IVDA    FAMILY HISTORY  Negative for CKD    REVIEW OF SYSTEMS    Constituational:  Patient denies fever, chills  Neurological:  Patient denies headaches, weakness  Skin:  Patient denies any rashes  Eyes:  Patient denies blurred vision, change in vision or loss of vision.    Ears, Nose and Mouth:  Patient denies ear pain, change in hearing   Respiratory:  Patient denies shortness of breath    Cardiovascular:  Patient denies chest pain  Gastrointestinal:  Patient denies nausea, vomiting, change in bowel habits, diarrhea  Genitourinary:  Patient denies dysuria or flank pain.    Musculoskeletal:  Patient denies joint pain  Hematologic/lymphatic:  Patient denies easy/excessive  bruising    PHYSICIAL EXAM    Vital Signs: Blood pressure 117/77, pulse 77, temperature 96.8 °F (36 °C), temperature source Temporal, resp. rate 20, height 6' (1.829 m), weight 98.5 kg (217 lb 2.5 oz), SpO2 97 %.  Constitutional:  NAD    HENT:  Moist mucous membranes  Lungs:  CTAB  Cardiac:  RRR, no rub  Abdomen:  Soft, nondistended. Normal bowel sounds. Nontender, +BS  : No suprapubic pain  Musculoskeletal:  No joint effusion or tenderness  Neurologic:  Alert & oriented x 3  Skin: No rash  Heme: No bruising  Extremities: 1+ edema    LABORATORY  Recent Labs   Lab 03/08/21 0418 03/07/21 0416 03/06/21 2041 03/06/21  1423   WBC 6.0 6.3 5.0 2.0*   HGB 9.3* 9.5* 10.0* 10.8*   PLT 67* 74* 85* 108*   INR  --  1.3 1.2 1.2     Recent Labs   Lab 03/08/21 0418 03/07/21 0416 03/06/21 2041 03/06/21  1423   SODIUM 136 137 137 138   POTASSIUM 4.1 4.6 4.1 4.5   CHLORIDE 102 103 103 103   CO2 22 20* 19* 18*   BUN 41* 40* 37* 37*   CREATININE 1.62* 1.89* 1.86* 1.67*   CALCIUM 9.3 9.1 8.5 9.1   MG  --  2.5* 1.7 0.9*       All labs, medications and radiology have been reviewed    EKG on my read shows sinus, no ST changes    All old records summarized in HPI as noted above    A/P  This is a 61-year-old male with a past medical history significant for type 2 diabetes, alcoholic cirrhosis, hypertension known to our service who was admitted for acute onset of rigors and weakness and fevers on Friday, consulted for acute kidney injury.    1.  Acute kidney injury  This is likely secondary to volume depletion in the setting of sepsis further exacerbated by ACE inhibitor.  His blood pressures have improved nicely to aggressive IV hydration per sepsis protocol.  He does have some evidence of lower extremity edema and states his oral intake is returned to his baseline, his fluids have been placed on hold.  We are holding his ACE inhibitor as well for the time being.  Encouraged him to increase his fluid intake if possible.  No need for  further imaging at this time    2.  Metabolic acidosis  Likely from acute kidney injury, this is improving    3.  Hypomagnesemia  This is an ongoing issue which she had during his last hospital stay.  We felt that it was a combination of nutritional deficiency along with outpatient use of PPI.  We did discuss with him during his last hospital stay that we potentially could switch him off of PPI to an H2 blocker instead but he was hesitant to do this and wanted to bring up with his PCP before proceeding    4.  Hypotension/UTI  Secondary to recurrent urinary tract infections, this has improved.  Seen by urology, and ID    Thank you for allowing us to participate in the care of your patient, we will continue to follow the patient closely with you

## 2023-06-25 LAB — MAMMOGRAPHY, EXTERNAL: NORMAL

## 2024-10-16 NOTE — PROGRESS NOTES
[facility]  10/17/24  Referring: Dr. Aguilar    REASON FOR CONSULT/CHIEF COMPLAINT/HPI     Reason for visit/ Chief complaint  New Patient  Heart Failure, SOB   HPI Iris Martin is a 69 y.o. female who is being seen as a self-referral for c/o chest pain and shortness of breath.  Her  is my patient.     She has a history of NICM (EF 20-25%), HTN, HLD, atrial fib, GERD. She was seen at [place] 2024 with MEJIA and found to have new onset afib rvr and possible chf exacerbation. Her symptoms have been ongoing for one month, no chest pain. Her dyspnea worsened over the past few days and she noticed a 15 pound weight gain when she checked her weight the day before. In the er notably with afib rvr, attempted cardizem gtt however patient subsequently became hypotensive so amiodarone was started after speaking with cardiology. She was also given a loading dose of digoxin. Was started on amio gtt.     She had DCCV 3/6/24.    She denies smoking but does report + etoh use, 4-5 drinks per day. She used to drink more and has been drinking since she was young. She had asthma and allergies as a child. States she used an inhaler and allergy injections. Her sister  of CHF at age 56. Iris has HTN, hyperlipidemia, depression.     In  she states she was admitted with perforated duodenal ulcer and underwent emergency surgery.     Today, she is here with her . She states she has not been doing much over the summer due to health problems. Iris works in her yard and enjoys gardening. Iris states she has chronic back pain that has been ongoing for years. She walks in grocery stores and tolerates well. She has SOB with stairs. She states she coughs at night. Denies PND. She snores, has not been tested for sleep apnea. She states she is not interested in looking into this.  Iris has chronic headaches.     Patient is adherent with medications and is tolerating them well without side effects. 
Emma Hyde DO   DULoxetine (CYMBALTA) 60 MG extended release capsule TAKE 1 CAPSULE EVERY DAY 8/19/19   Emma Hyde DO   amLODIPine (NORVASC) 5 MG tablet TAKE 1 TABLET EVERY DAY 8/19/19   Emma Hyde DO   cetirizine (ZYRTEC) 10 MG tablet TAKE 1 TABLET EVERY DAY 8/19/19   Emma Hyde DO   fluticasone (FLONASE) 50 MCG/ACT nasal spray USE 2 SPRAYS IN EACH NOSTRIL EVERY DAY 8/19/19   Emma Hyde DO   fluticasone (FLOVENT HFA) 110 MCG/ACT inhaler Inhale 2 puffs into the lungs 2 times daily 7/8/19 7/7/20  Emma Hyde DO   azelastine (ASTELIN) 0.1 % nasal spray 2 sprays by Nasal route 2 times daily Use in each nostril as directed 7/8/19   Emma Hyde DO   albuterol sulfate  (90 Base) MCG/ACT inhaler Inhale 2 puffs into the lungs 4 times daily as needed for Wheezing 1/15/19   Emma Hyde DO   acetaminophen (TYLENOL) 500 MG tablet Take 500 mg by mouth every 6 hours as needed for Pain As needed    Provider, Historical, MD       PHYSICAL EXAM        There were no vitals filed for this visit.       Gen Alert, cooperative, no distress Heart  Regular rate and rhythm, *** murmur   Head Normocephalic, atraumatic, no abnormalities Abd  Soft, NT, +BS, no mass, no OM   Eyes PERRLA, conj/corn clear Ext  Ext nl, AT, no C/C, *** edema   Nose Nares normal, no drain age, Non-tender Pulse 2+ and symmetric   Throat Lips, mucosa, tongue normal Skin Color/text/turg nl, no rash/lesions   Neck S/S, TM, NT, no bruit Psych Nl mood and affect   Lung CTA-B, unlabored, no DTP     Ch wall NT, no deform       LABS and Imaging     Relevant and available CV data reviewed:  EKG 2/2024> AF w/ rvr  DCCV 3/6/24  EKG 7/19/24> NSR  Echo 2/2024:  EF 20-25%, severe global hypokinesis   Cath 2/272024: Result Date: 2/27/2024  : 1) Normal coronary arteries 2) Severe LV systolic dysfunction with elevated EDP 3) Normal right radial artery by ultrasound 4) Persistent afib with RVR..., IV dig (0.5

## 2024-10-17 ENCOUNTER — OFFICE VISIT (OUTPATIENT)
Dept: CARDIOLOGY CLINIC | Age: 69
End: 2024-10-17

## 2024-10-17 VITALS
HEIGHT: 67 IN | BODY MASS INDEX: 32.49 KG/M2 | WEIGHT: 207 LBS | HEART RATE: 59 BPM | DIASTOLIC BLOOD PRESSURE: 60 MMHG | OXYGEN SATURATION: 97 % | SYSTOLIC BLOOD PRESSURE: 98 MMHG

## 2024-10-17 DIAGNOSIS — R53.83 OTHER FATIGUE: ICD-10-CM

## 2024-10-17 DIAGNOSIS — F10.90 ALCOHOL USE DISORDER: ICD-10-CM

## 2024-10-17 DIAGNOSIS — I77.810 AORTIC ROOT DILATATION (HCC): ICD-10-CM

## 2024-10-17 DIAGNOSIS — I48.0 PAF (PAROXYSMAL ATRIAL FIBRILLATION) (HCC): ICD-10-CM

## 2024-10-17 DIAGNOSIS — I42.8 NONISCHEMIC CARDIOMYOPATHY (HCC): ICD-10-CM

## 2024-10-17 DIAGNOSIS — E78.49 OTHER HYPERLIPIDEMIA: ICD-10-CM

## 2024-10-17 DIAGNOSIS — I48.0 PAF (PAROXYSMAL ATRIAL FIBRILLATION) (HCC): Primary | ICD-10-CM

## 2024-10-17 DIAGNOSIS — I10 HYPERTENSION, UNSPECIFIED TYPE: ICD-10-CM

## 2024-10-17 DIAGNOSIS — R06.02 SHORTNESS OF BREATH: ICD-10-CM

## 2024-10-17 PROBLEM — I42.9 MYOCARDIOPATHY (HCC): Status: ACTIVE | Noted: 2024-10-17

## 2024-10-17 LAB
ALBUMIN SERPL-MCNC: 4.4 G/DL (ref 3.4–5)
ALBUMIN/GLOB SERPL: 1.8 {RATIO} (ref 1.1–2.2)
ALP SERPL-CCNC: 88 U/L (ref 40–129)
ALT SERPL-CCNC: 21 U/L (ref 10–40)
ANION GAP SERPL CALCULATED.3IONS-SCNC: 10 MMOL/L (ref 3–16)
AST SERPL-CCNC: 22 U/L (ref 15–37)
BASOPHILS # BLD: 0.1 K/UL (ref 0–0.2)
BASOPHILS NFR BLD: 1 %
BILIRUB SERPL-MCNC: 0.3 MG/DL (ref 0–1)
BUN SERPL-MCNC: 20 MG/DL (ref 7–20)
CALCIUM SERPL-MCNC: 10.1 MG/DL (ref 8.3–10.6)
CHLORIDE SERPL-SCNC: 101 MMOL/L (ref 99–110)
CO2 SERPL-SCNC: 30 MMOL/L (ref 21–32)
CREAT SERPL-MCNC: 0.8 MG/DL (ref 0.6–1.2)
DEPRECATED RDW RBC AUTO: 13.8 % (ref 12.4–15.4)
EOSINOPHIL # BLD: 0.6 K/UL (ref 0–0.6)
EOSINOPHIL NFR BLD: 9.8 %
FERRITIN SERPL IA-MCNC: 77.9 NG/ML (ref 15–150)
GFR SERPLBLD CREATININE-BSD FMLA CKD-EPI: 81 ML/MIN/{1.73_M2}
GLUCOSE SERPL-MCNC: 86 MG/DL (ref 70–99)
HCT VFR BLD AUTO: 39 % (ref 36–48)
HGB BLD-MCNC: 12.9 G/DL (ref 12–16)
LYMPHOCYTES # BLD: 2.1 K/UL (ref 1–5.1)
LYMPHOCYTES NFR BLD: 37.5 %
MAGNESIUM SERPL-MCNC: 2.08 MG/DL (ref 1.8–2.4)
MCH RBC QN AUTO: 33.9 PG (ref 26–34)
MCHC RBC AUTO-ENTMCNC: 33.2 G/DL (ref 31–36)
MCV RBC AUTO: 102.1 FL (ref 80–100)
MONOCYTES # BLD: 0.5 K/UL (ref 0–1.3)
MONOCYTES NFR BLD: 9.3 %
NEUTROPHILS # BLD: 2.4 K/UL (ref 1.7–7.7)
NEUTROPHILS NFR BLD: 42.4 %
NT-PROBNP SERPL-MCNC: 252 PG/ML (ref 0–124)
PLATELET # BLD AUTO: 263 K/UL (ref 135–450)
PMV BLD AUTO: 9.2 FL (ref 5–10.5)
POTASSIUM SERPL-SCNC: 4 MMOL/L (ref 3.5–5.1)
PROT SERPL-MCNC: 6.8 G/DL (ref 6.4–8.2)
RBC # BLD AUTO: 3.82 M/UL (ref 4–5.2)
SODIUM SERPL-SCNC: 141 MMOL/L (ref 136–145)
TSH SERPL DL<=0.005 MIU/L-ACNC: 1.29 UIU/ML (ref 0.27–4.2)
WBC # BLD AUTO: 5.6 K/UL (ref 4–11)

## 2024-10-17 RX ORDER — LORAZEPAM 1 MG/1
TABLET ORAL
Qty: 2 TABLET | Refills: 0 | Status: SHIPPED | OUTPATIENT
Start: 2024-10-17 | End: 2024-10-17 | Stop reason: SDUPTHER

## 2024-10-17 RX ORDER — LORAZEPAM 1 MG/1
TABLET ORAL
Qty: 2 TABLET | Refills: 0 | Status: SHIPPED | OUTPATIENT
Start: 2024-10-17 | End: 2024-10-18

## 2024-10-17 RX ORDER — APIXABAN 5 MG/1
5 TABLET, FILM COATED ORAL 2 TIMES DAILY
COMMUNITY

## 2024-10-17 RX ORDER — ATORVASTATIN CALCIUM 40 MG/1
40 TABLET, FILM COATED ORAL DAILY
COMMUNITY
Start: 2024-04-12

## 2024-10-17 RX ORDER — METOPROLOL SUCCINATE 200 MG/1
200 TABLET, EXTENDED RELEASE ORAL DAILY
COMMUNITY
Start: 2024-05-07

## 2024-10-17 RX ORDER — FUROSEMIDE 40 MG/1
40 TABLET ORAL 2 TIMES DAILY
COMMUNITY
Start: 2024-05-07

## 2024-10-17 RX ORDER — SPIRONOLACTONE 25 MG/1
25 TABLET ORAL DAILY
COMMUNITY
Start: 2024-05-07

## 2024-10-17 NOTE — PATIENT INSTRUCTIONS
Have labs drawn today     Schedule cardiac MRI and breathing test   A prescription with be given to you for Ativan 1mg to take 1 hour prior to the cardiac MRI, bring the other pill with you to the test.  You will need someone to drive you to the MRI    Keep fluids at 64 oz per day    Weigh daily in the morning, call Dr Rae if your weight is up 3 pounds in 1 day    Monitor blood pressure daily and keep a log to bring to your appointments     See primary care physician regarding depression and alcohol intake

## 2024-10-18 LAB
ANA SER QL IA: NEGATIVE
HIV 1+2 AB+HIV1 P24 AG SERPL QL IA: NORMAL
HIV 2 AB SERPL QL IA: NORMAL
HIV1 AB SERPL QL IA: NORMAL
HIV1 P24 AG SERPL QL IA: NORMAL
REAGIN+T PALLIDUM IGG+IGM SERPL-IMP: NORMAL

## 2024-10-21 ENCOUNTER — TELEPHONE (OUTPATIENT)
Dept: CARDIOLOGY CLINIC | Age: 69
End: 2024-10-21

## 2024-10-21 NOTE — TELEPHONE ENCOUNTER
Received request from patient for records from [facility] be sent to [facility]   I am unable to supply those records. Form sent back to patient and left message on recorder

## 2024-10-23 ENCOUNTER — HOSPITAL ENCOUNTER (OUTPATIENT)
Dept: PULMONOLOGY | Age: 69
Discharge: HOME | End: 2024-10-23
Attending: INTERNAL MEDICINE
Payer: MEDICARE

## 2024-10-23 DIAGNOSIS — R06.02 SHORTNESS OF BREATH: ICD-10-CM

## 2024-10-23 LAB
DLCO %PRED: 73 %
DLCO PRED: NORMAL
DLCO/VA %PRED: NORMAL
DLCO/VA PRED: NORMAL
DLCO/VA: NORMAL
DLCO: NORMAL
EXPIRATORY TIME-POST: NORMAL
EXPIRATORY TIME: NORMAL
FEF 25-75 %CHNG: NORMAL
FEF 25-75 POST %PRED: NORMAL
FEF 25-75% %PRED-PRE: NORMAL
FEF 25-75% PRED: NORMAL
FEF 25-75-POST: NORMAL
FEF 25-75-PRE: NORMAL
FEV1 %PRED-POST: NORMAL
FEV1 %PRED-PRE: 85 %
FEV1 PRED: NORMAL
FEV1-POST: NORMAL
FEV1-PRE: NORMAL
FEV1/FVC %PRED-POST: NORMAL
FEV1/FVC %PRED-PRE: NORMAL
FEV1/FVC PRED: NORMAL
FEV1/FVC-POST: NORMAL
FEV1/FVC-PRE: 78 %
FVC %PRED-POST: NORMAL
FVC %PRED-PRE: NORMAL
FVC PRED: NORMAL
FVC-POST: NORMAL
FVC-PRE: NORMAL
GAW %PRED: NORMAL
GAW PRED: NORMAL
GAW: NORMAL
IC PRE %PRED: NORMAL
IC PRED: NORMAL
IC: NORMAL
MEP: NORMAL
MIP: NORMAL
MVV %PRED-PRE: NORMAL
MVV PRED: NORMAL
MVV-PRE: NORMAL
PEF %PRED-POST: NORMAL
PEF %PRED-PRE: NORMAL
PEF PRED: NORMAL
PEF%CHNG: NORMAL
PEF-POST: NORMAL
PEF-PRE: NORMAL
RAW %PRED: NORMAL
RAW PRED: NORMAL
RAW: NORMAL
RV PRE %PRED: NORMAL
RV PRED: NORMAL
RV: NORMAL
SVC %PRED: NORMAL
SVC PRED: NORMAL
SVC: NORMAL
TLC PRE %PRED: 95 %
TLC PRED: NORMAL
TLC: NORMAL
VA %PRED: NORMAL
VA PRED: NORMAL
VA: NORMAL
VTG %PRED: NORMAL
VTG PRED: NORMAL
VTG: NORMAL

## 2024-10-23 PROCEDURE — 94726 PLETHYSMOGRAPHY LUNG VOLUMES: CPT

## 2024-10-23 PROCEDURE — 94010 BREATHING CAPACITY TEST: CPT

## 2024-10-23 PROCEDURE — 94760 N-INVAS EAR/PLS OXIMETRY 1: CPT

## 2024-10-23 PROCEDURE — 94729 DIFFUSING CAPACITY: CPT

## 2024-10-23 ASSESSMENT — PULMONARY FUNCTION TESTS
FEV1_PERCENT_PREDICTED_PRE: 85
FEV1/FVC_PRE: 78

## 2024-10-24 NOTE — PROCEDURES
Pulmonary Function Testing      Patient name:  Iris Martin      Unit #:   [id number]   Date of test: 10/23/2024  Date of interpretation:   10/24/2024    Ms. Iris Martin is a 69 y.o. year-old.  The spirometry data were acceptable and reproducible.     Spirometry:  Flow volume loops were normal. The FEV-1/FVC ratio was normal. The  best FEV-1 was 2.24 liters (85% of predicted), which was normal. The FVC was 2.88 liters (83% of predicted), which was normal. Response to inhaled bronchodilators (albuterol) was not performed.    Lung volumes:  Lung volumes were tested by plethysmography. The total lung capacity was 5.19 liters (95% of predicted), which was normal. The residual volume was 2.29 liters (106% of predicted), which was normal. The ratio of residual volume to total lung capacity (RV/TLC) was 112%, which was normal. Specific airway resistance was increased.    Diffusion capacity was found to be decreased.       Interpretation:  Possible  mild obstructive airway disease with reduced diffusion capacity.      Rob Renteria MD, [facility] Pulmonary, Critical Care and Sleep Medicine  [address], [address], [place] [address]

## 2024-10-25 ASSESSMENT — ENCOUNTER SYMPTOMS
SHORTNESS OF BREATH: 1
CHEST TIGHTNESS: 1

## 2024-10-31 ENCOUNTER — HOSPITAL ENCOUNTER (OUTPATIENT)
Dept: MRI IMAGING | Age: 69
Discharge: HOME | End: 2024-10-31
Attending: INTERNAL MEDICINE
Payer: MEDICARE

## 2024-10-31 DIAGNOSIS — I42.8 NONISCHEMIC CARDIOMYOPATHY (HCC): ICD-10-CM

## 2024-10-31 DIAGNOSIS — I77.810 AORTIC ROOT DILATATION (HCC): ICD-10-CM

## 2024-10-31 PROCEDURE — 75565 CARD MRI VELOC FLOW MAPPING: CPT

## 2024-10-31 PROCEDURE — 6360000004 HC RX CONTRAST MEDICATION: Performed by: INTERNAL MEDICINE

## 2024-10-31 PROCEDURE — A9585 GADOBUTROL INJECTION: HCPCS | Performed by: INTERNAL MEDICINE

## 2024-10-31 RX ORDER — GADOBUTROL 604.72 MG/ML
14 INJECTION INTRAVENOUS
Status: COMPLETED | OUTPATIENT
Start: 2024-10-31 | End: 2024-10-31

## 2024-10-31 RX ADMIN — GADOBUTROL 14 ML: 604.72 INJECTION INTRAVENOUS at 11:42

## 2024-11-07 ENCOUNTER — TELEPHONE (OUTPATIENT)
Dept: CARDIOLOGY CLINIC | Age: 69
End: 2024-11-07

## 2024-11-07 NOTE — TELEPHONE ENCOUNTER
Pt called to request her results from her MRI, lab and allergy test.  Please call to discuss her concerns.  Thank you

## 2024-11-08 DIAGNOSIS — R06.02 SHORTNESS OF BREATH: Primary | ICD-10-CM

## 2024-11-08 NOTE — TELEPHONE ENCOUNTER
I have sent her results on my chart    Please let her know Dr Rae reviewed your recent testing and your heart function is back to normal. This is good. The PFT suggest that you may have asthma, he would like you to see a pulmonologist. A referral has been sent to Dr Egan

## 2025-01-28 DIAGNOSIS — R53.83 OTHER FATIGUE: ICD-10-CM

## 2025-01-28 DIAGNOSIS — I48.0 PAF (PAROXYSMAL ATRIAL FIBRILLATION) (HCC): ICD-10-CM

## 2025-01-28 DIAGNOSIS — I42.8 NONISCHEMIC CARDIOMYOPATHY (HCC): ICD-10-CM

## 2025-01-28 NOTE — TELEPHONE ENCOUNTER
Medication Refill    Medication needing refilled:  ELIQUIS 5 MG TABS tablet [[id number]]        Dosage of the medication:  5mg    How are you taking this medication (QD, BID, TID, QID, PRN):  1 tab 2x per day    30 or 90 day supply called in:  30 days    When will you run out of your medication:    Which Pharmacy are we sending the medication to?:    She would like a printed prescription.

## 2025-01-30 RX ORDER — APIXABAN 5 MG/1
5 TABLET, FILM COATED ORAL 2 TIMES DAILY
Qty: 60 TABLET | Refills: 11 | Status: SHIPPED | OUTPATIENT
Start: 2025-01-30

## 2025-01-30 NOTE — TELEPHONE ENCOUNTER
Received refill request for Eliquis 5mg tablets.     Last OV: 12- DKW    Next OV: 04- DKW    Last Labs: 10- CBC    Last Filled: 10-

## 2025-03-31 ASSESSMENT — ENCOUNTER SYMPTOMS
SHORTNESS OF BREATH: 1
CHEST TIGHTNESS: 1

## 2025-03-31 NOTE — PROGRESS NOTES
[facility] HEART [place]  4/1/25  Referring: Dr. Charles ref. provider found    REASON FOR CONSULT/CHIEF COMPLAINT/HPI     Reason for visit/ Chief complaint  Follow up  NICM   HPI Iris Martin is a 69 y.o. female who is being seen as a follow up  for management of  NICM (EF 20-25%), HTN, HLD, atrial fib, GERD. She had DCCV 3/6/24.      Her EF has since normalized. She has cut back on alcohol consumption    Today, she states she has episodes of random sweating associated with feeling clammy. No associated diarrhea.  No chest pain change in exertional shortness of breath palpitation or dizziness. She is down 7 pounds. She can walk up a flight of stairs and mow the lawn without stopping.       Patient is adherent with medications and is tolerating them well without side effects.     HISTORY/ALLERGIES/ROS     MedHx:  has a past medical history of Asthma, Depression, Hypertension, and Insomnia.  SurgHx:  has a past surgical history that includes Carpal tunnel release (Bilateral, 2010) and Abdominal exploration surgery (8/15/13).   SocHx:  reports that she has never smoked. She has never been exposed to tobacco smoke. She has never used smokeless tobacco. She reports current alcohol use of about 42.0 standard drinks of alcohol per week. She reports that she does not use drugs.   FamHx: No family history of premature coronary artery disease, sudden death, or aneurysm  Allergies: Molds & smuts and Cat dander   ROS:   Review of Systems   Respiratory:  Positive for chest tightness and shortness of breath.           MEDICATIONS      Prior to Admission medications    Medication Sig Start Date End Date Taking? Authorizing Provider   amiodarone (CORDARONE) 200 MG tablet Take 1 tablet by mouth every other day 7/19/24  Yes ProviderArtis MD   zolpidem (AMBIEN) 10 MG tablet Take 1 tablet by mouth nightly as needed. 1/24/25  Yes ProviderArtis MD   levocetirizine (XYZAL) 5 MG tablet Take 1 tablet by mouth daily 3/14/25 3/14/26

## 2025-04-01 ENCOUNTER — OFFICE VISIT (OUTPATIENT)
Dept: CARDIOLOGY CLINIC | Age: 70
End: 2025-04-01
Payer: MEDICARE

## 2025-04-01 VITALS
DIASTOLIC BLOOD PRESSURE: 60 MMHG | HEIGHT: 67 IN | OXYGEN SATURATION: 98 % | SYSTOLIC BLOOD PRESSURE: 116 MMHG | BODY MASS INDEX: 32.49 KG/M2 | WEIGHT: 207 LBS | HEART RATE: 70 BPM

## 2025-04-01 DIAGNOSIS — R53.83 OTHER FATIGUE: ICD-10-CM

## 2025-04-01 DIAGNOSIS — I10 HYPERTENSION, UNSPECIFIED TYPE: ICD-10-CM

## 2025-04-01 DIAGNOSIS — I77.810 AORTIC ROOT DILATATION: ICD-10-CM

## 2025-04-01 DIAGNOSIS — I48.0 PAF (PAROXYSMAL ATRIAL FIBRILLATION) (HCC): ICD-10-CM

## 2025-04-01 DIAGNOSIS — I50.32 HEART FAILURE WITH RECOVERED EJECTION FRACTION (HFRECEF) (HCC): Primary | ICD-10-CM

## 2025-04-01 DIAGNOSIS — R06.02 SHORTNESS OF BREATH: ICD-10-CM

## 2025-04-01 DIAGNOSIS — E78.49 OTHER HYPERLIPIDEMIA: ICD-10-CM

## 2025-04-01 DIAGNOSIS — I42.8 NONISCHEMIC CARDIOMYOPATHY (HCC): ICD-10-CM

## 2025-04-01 LAB — TSH SERPL DL<=0.005 MIU/L-ACNC: 2.63 UIU/ML (ref 0.27–4.2)

## 2025-04-01 PROCEDURE — 1123F ACP DISCUSS/DSCN MKR DOCD: CPT | Performed by: INTERNAL MEDICINE

## 2025-04-01 PROCEDURE — 1159F MED LIST DOCD IN RCRD: CPT | Performed by: INTERNAL MEDICINE

## 2025-04-01 PROCEDURE — 99214 OFFICE O/P EST MOD 30 MIN: CPT | Performed by: INTERNAL MEDICINE

## 2025-04-01 PROCEDURE — 3078F DIAST BP <80 MM HG: CPT | Performed by: INTERNAL MEDICINE

## 2025-04-01 PROCEDURE — 3074F SYST BP LT 130 MM HG: CPT | Performed by: INTERNAL MEDICINE

## 2025-04-01 RX ORDER — APIXABAN 5 MG/1
5 TABLET, FILM COATED ORAL 2 TIMES DAILY
Qty: 180 TABLET | Refills: 3 | Status: SHIPPED | OUTPATIENT
Start: 2025-04-01

## 2025-04-01 RX ORDER — LEVOCETIRIZINE DIHYDROCHLORIDE 5 MG/1
5 TABLET, FILM COATED ORAL DAILY
COMMUNITY
Start: 2025-03-14 | End: 2026-03-14

## 2025-04-01 RX ORDER — ZOLPIDEM TARTRATE 10 MG/1
10 TABLET ORAL NIGHTLY PRN
COMMUNITY
Start: 2025-01-24

## 2025-04-01 RX ORDER — AMIODARONE HYDROCHLORIDE 200 MG/1
200 TABLET ORAL EVERY OTHER DAY
COMMUNITY
Start: 2024-07-19

## 2025-04-03 ENCOUNTER — RESULTS FOLLOW-UP (OUTPATIENT)
Dept: CARDIOLOGY CLINIC | Age: 70
End: 2025-04-03

## 2025-04-04 NOTE — TELEPHONE ENCOUNTER
----- Message from MARJORIE SEXTON RN sent at 4/3/2025  4:29 PM EDT -----  Please let her know her thyroid levels are ok, no changes

## 2025-06-04 ENCOUNTER — HOSPITAL ENCOUNTER (OUTPATIENT)
Dept: CT IMAGING | Age: 70
Discharge: HOME | End: 2025-06-04
Attending: INTERNAL MEDICINE
Payer: MEDICARE

## 2025-06-04 DIAGNOSIS — I77.810 AORTIC ROOT DILATATION: ICD-10-CM

## 2025-06-04 PROCEDURE — 71250 CT THORAX DX C-: CPT

## 2025-06-09 ENCOUNTER — TELEPHONE (OUTPATIENT)
Dept: CARDIOLOGY CLINIC | Age: 70
End: 2025-06-09

## 2025-06-09 DIAGNOSIS — I42.8 NONISCHEMIC CARDIOMYOPATHY (HCC): ICD-10-CM

## 2025-06-09 DIAGNOSIS — I77.810 AORTIC ROOT DILATATION: Primary | ICD-10-CM

## 2025-06-09 NOTE — TELEPHONE ENCOUNTER
Pt called stating they would like call back to discuss the phone call last week. Pt stated they were shook up when they received the tai and would like a call back.    Pls advise

## 2025-06-09 NOTE — TELEPHONE ENCOUNTER
I spoke with pt she prefers not to go to [facility] because she knows that if she has to have surgery that she will end up in [place] and doesn't want to drive there. She would like a dr that does surgery closer to [place]. She also requested a refill on the Metoprolol ,  it was prescribed by other provider , but the [facility] is down and cannot get a refill.

## 2025-06-10 RX ORDER — METOPROLOL SUCCINATE 200 MG/1
200 TABLET, EXTENDED RELEASE ORAL DAILY
Qty: 90 TABLET | Refills: 3 | Status: SHIPPED | OUTPATIENT
Start: 2025-06-10

## 2025-06-10 NOTE — TELEPHONE ENCOUNTER
Dr Rae sent in a refill of metoprolol to the pharmacy  He has put in a referral to Surjit, who is a cardiac surgeon at [facility]  [phone]

## 2025-06-10 NOTE — TELEPHONE ENCOUNTER
Spoke with patient and advised her of the message from   Vandana Stewart RN.  Verbalized understanding.

## 2025-06-19 NOTE — PROGRESS NOTES
[facility]   New Patient Clinic Note     6/19/2025 3:43 PM  Surgeon:  Surjit Hughes for :      New Patient -Nonischemic cardiomyopathy , Aortic root aneurysm       Chief complaint :  Subjective:  Ms. Martin is a 71 yo female seen today for an aortic root aneurysm.  She denies CP and upper back pain.  She reports that she was referred to [facility] but would prefer her care closer to home.  Denies prior mi and stroke.  Denies limitations with activity. ? Family history of aneurysms.    Review of Systems  Constitutional:  +night sweats, +headaches, weight loss.  Eyes:  No glaucoma, cataracts.   ENMT:  No nosebleeds, deviated septum.   Cardiac:  No arrhythmias.  Vascular: claudication, varicosities.  GI:  +PUD larynx, heartburn.  :  No kidney stones, frequent UTIs  Musculoskeletal: +arthritis, gout.  Respiratory: +SOB, emphysema, +asthma.  Integumentary:  No dermatitis, itching, rash.  Neurological:  No stroke, TIAs, seizures.  Psychiatric: + depression, anxiety.  Endocrine: No diabetes, thyroid issues.  Hematologic:  No bleeding, easy bruising.  Immunologic:  No known cancer, steroid therapies.    Past Medical History:        Diagnosis Date    Asthma     childhood    Depression     Hypertension     Insomnia        Past Surgical History:        Procedure Laterality Date    ABDOMINAL EXPLORATION SURGERY  08/15/2013    repair perforated ulcer with gram patch    ABLATION OF DYSRHYTHMIC FOCUS      CARPAL TUNNEL RELEASE Bilateral 2010       Allergies:  Molds & smuts and Cat dander    Social History:    TOBACCO:   reports that she has never smoked. She has never been exposed to tobacco smoke. She has never used smokeless tobacco.  ETOH:   reports current alcohol use of about 42.0 standard drinks of alcohol per week.  DRUGS:   reports no history of drug use.  LIFESTYLE: Active  MARITAL STATUS:    OCCUPATION:  Retired     Family History:        Problem Relation Age of Onset    Cancer

## 2025-06-20 ENCOUNTER — OFFICE VISIT (OUTPATIENT)
Age: 70
End: 2025-06-20

## 2025-06-20 VITALS
SYSTOLIC BLOOD PRESSURE: 118 MMHG | TEMPERATURE: 96.4 F | WEIGHT: 202 LBS | OXYGEN SATURATION: 93 % | HEIGHT: 67 IN | HEART RATE: 56 BPM | BODY MASS INDEX: 31.71 KG/M2 | DIASTOLIC BLOOD PRESSURE: 70 MMHG

## 2025-06-20 DIAGNOSIS — I71.21 ANEURYSM OF ASCENDING AORTA WITHOUT RUPTURE: Primary | ICD-10-CM

## 2025-06-20 DIAGNOSIS — Q25.43 AORTIC ROOT ANEURYSM: Primary | ICD-10-CM

## 2025-06-20 DIAGNOSIS — Q25.43 AORTIC ROOT ANEURYSM: ICD-10-CM

## 2025-06-26 ENCOUNTER — HOSPITAL ENCOUNTER (OUTPATIENT)
Age: 70
Discharge: HOME | End: 2025-06-28
Attending: THORACIC SURGERY (CARDIOTHORACIC VASCULAR SURGERY)
Payer: MEDICARE

## 2025-06-26 ENCOUNTER — HOSPITAL ENCOUNTER (OUTPATIENT)
Dept: CT IMAGING | Age: 70
Discharge: HOME | End: 2025-06-26
Attending: THORACIC SURGERY (CARDIOTHORACIC VASCULAR SURGERY)
Payer: MEDICARE

## 2025-06-26 VITALS — WEIGHT: 202 LBS | BODY MASS INDEX: 31.71 KG/M2 | HEIGHT: 67 IN

## 2025-06-26 DIAGNOSIS — Q25.43 AORTIC ROOT ANEURYSM: ICD-10-CM

## 2025-06-26 DIAGNOSIS — I71.21 ANEURYSM OF ASCENDING AORTA WITHOUT RUPTURE: ICD-10-CM

## 2025-06-26 LAB
ECHO AO ROOT DIAM: 5.1 CM
ECHO AO ROOT INDEX: 2.51 CM/M2
ECHO AR MAX VEL PISA: 4.5 M/S
ECHO AV AREA PEAK VELOCITY: 3.6 CM2
ECHO AV AREA VTI: 3.3 CM2
ECHO AV AREA/BSA PEAK VELOCITY: 1.8 CM2/M2
ECHO AV AREA/BSA VTI: 1.6 CM2/M2
ECHO AV MEAN GRADIENT: 3 MMHG
ECHO AV MEAN VELOCITY: 0.8 M/S
ECHO AV PEAK GRADIENT: 5 MMHG
ECHO AV PEAK VELOCITY: 1.2 M/S
ECHO AV REGURGITANT PHT: 547 MS
ECHO AV VELOCITY RATIO: 0.83
ECHO AV VTI: 27.9 CM
ECHO BSA: 2.08 M2
ECHO EST RA PRESSURE: 3 MMHG
ECHO IVC EXP: 1.4 CM
ECHO IVC INSP: 0.7 CM
ECHO LA AREA 2C: 21.1 CM2
ECHO LA AREA 4C: 25.5 CM2
ECHO LA MAJOR AXIS: 6.3 CM
ECHO LA MINOR AXIS: 5.2 CM
ECHO LA VOL BP: 82 ML (ref 22–52)
ECHO LA VOL MOD A2C: 69 ML (ref 22–52)
ECHO LA VOL MOD A4C: 83 ML (ref 22–52)
ECHO LA VOL/BSA BIPLANE: 40 ML/M2 (ref 16–34)
ECHO LA VOLUME INDEX MOD A2C: 34 ML/M2 (ref 16–34)
ECHO LA VOLUME INDEX MOD A4C: 41 ML/M2 (ref 16–34)
ECHO LV E' LATERAL VELOCITY: 8.38 CM/S
ECHO LV E' SEPTAL VELOCITY: 5 CM/S
ECHO LV EDV 3D: 135 ML
ECHO LV EDV INDEX 3D: 67 ML/M2
ECHO LV EF PHYSICIAN: 55 %
ECHO LV EJECTION FRACTION 3D: 56 %
ECHO LV ESV 3D: 60 ML
ECHO LV ESV INDEX 3D: 30 ML/M2
ECHO LV MASS 3D INDEX: 68 G/M2
ECHO LV MASS 3D: 138 G
ECHO LVOT AREA: 4.2 CM2
ECHO LVOT AV VTI INDEX: 0.79
ECHO LVOT DIAM: 2.3 CM
ECHO LVOT MEAN GRADIENT: 2 MMHG
ECHO LVOT PEAK GRADIENT: 4 MMHG
ECHO LVOT PEAK VELOCITY: 1 M/S
ECHO LVOT STROKE VOLUME INDEX: 45 ML/M2
ECHO LVOT SV: 91.4 ML
ECHO LVOT VTI: 22 CM
ECHO MV A VELOCITY: 0.69 M/S
ECHO MV AREA VTI: 3.6 CM2
ECHO MV E DECELERATION TIME (DT): 238 MS
ECHO MV E VELOCITY: 0.63 M/S
ECHO MV E/A RATIO: 0.91
ECHO MV E/E' LATERAL: 7.52
ECHO MV E/E' RATIO (AVERAGED): 10.06
ECHO MV E/E' SEPTAL: 12.6
ECHO MV LVOT VTI INDEX: 1.17
ECHO MV MAX VELOCITY: 0.7 M/S
ECHO MV MEAN GRADIENT: 1 MMHG
ECHO MV MEAN VELOCITY: 0.4 M/S
ECHO MV PEAK GRADIENT: 2 MMHG
ECHO MV VTI: 25.7 CM
ECHO RA AREA 4C: 22.6 CM2
ECHO RA END SYSTOLIC VOLUME APICAL 4 CHAMBER INDEX BSA: 35 ML/M2
ECHO RA VOLUME: 71 ML
ECHO RIGHT VENTRICULAR SYSTOLIC PRESSURE (RVSP): 32 MMHG
ECHO RV BASAL DIMENSION: 4.5 CM
ECHO RV FREE WALL PEAK S': 15.4 CM/S
ECHO RV LONGITUDINAL DIMENSION: 7.2 CM
ECHO RV MID DIMENSION: 3.7 CM
ECHO RV TAPSE: 2.7 CM (ref 1.7–?)
ECHO TV REGURGITANT MAX VELOCITY: 2.69 M/S
ECHO TV REGURGITANT PEAK GRADIENT: 29 MMHG

## 2025-06-26 PROCEDURE — 76376 3D RENDER W/INTRP POSTPROCES: CPT

## 2025-06-26 PROCEDURE — 6360000004 HC RX CONTRAST MEDICATION: Performed by: THORACIC SURGERY (CARDIOTHORACIC VASCULAR SURGERY)

## 2025-06-26 PROCEDURE — 74174 CTA ABD&PLVS W/CONTRAST: CPT

## 2025-06-26 PROCEDURE — 93306 TTE W/DOPPLER COMPLETE: CPT | Performed by: INTERNAL MEDICINE

## 2025-06-26 PROCEDURE — 76376 3D RENDER W/INTRP POSTPROCES: CPT | Performed by: INTERNAL MEDICINE

## 2025-06-26 RX ORDER — IOPAMIDOL 755 MG/ML
75 INJECTION, SOLUTION INTRAVASCULAR
Status: COMPLETED | OUTPATIENT
Start: 2025-06-26 | End: 2025-06-26

## 2025-06-26 RX ADMIN — IOPAMIDOL 75 ML: 755 INJECTION, SOLUTION INTRAVENOUS at 09:07

## 2025-07-10 DIAGNOSIS — Q25.43 AORTIC ROOT ANEURYSM: ICD-10-CM

## 2025-07-10 DIAGNOSIS — I71.21 ANEURYSM OF ASCENDING AORTA WITHOUT RUPTURE: ICD-10-CM

## 2025-07-11 LAB
ALBUMIN SERPL-MCNC: 4.6 G/DL (ref 3.4–5)
ALBUMIN/GLOB SERPL: 1.7 {RATIO} (ref 1.1–2.2)
ALP SERPL-CCNC: 81 U/L (ref 40–129)
ALT SERPL-CCNC: 26 U/L (ref 10–40)
ANION GAP SERPL CALCULATED.3IONS-SCNC: 12 MMOL/L (ref 3–16)
AST SERPL-CCNC: 31 U/L (ref 15–37)
BILIRUB SERPL-MCNC: 0.4 MG/DL (ref 0–1)
BUN SERPL-MCNC: 17 MG/DL (ref 7–20)
CALCIUM SERPL-MCNC: 9.7 MG/DL (ref 8.3–10.6)
CHLORIDE SERPL-SCNC: 102 MMOL/L (ref 99–110)
CO2 SERPL-SCNC: 22 MMOL/L (ref 21–32)
CREAT SERPL-MCNC: 1 MG/DL (ref 0.6–1.2)
GFR SERPLBLD CREATININE-BSD FMLA CKD-EPI: 61 ML/MIN/{1.73_M2}
GLUCOSE SERPL-MCNC: 98 MG/DL (ref 70–99)
POTASSIUM SERPL-SCNC: 4.6 MMOL/L (ref 3.5–5.1)
PROT SERPL-MCNC: 7.3 G/DL (ref 6.4–8.2)
SODIUM SERPL-SCNC: 136 MMOL/L (ref 136–145)

## 2025-07-28 DIAGNOSIS — Q25.43 AORTIC ROOT ANEURYSM: Primary | ICD-10-CM

## 2025-07-28 DIAGNOSIS — I70.0 ATHEROSCLEROSIS OF AORTA: ICD-10-CM

## 2025-07-28 DIAGNOSIS — I71.21 ANEURYSM OF ASCENDING AORTA WITHOUT RUPTURE: ICD-10-CM

## 2025-07-28 DIAGNOSIS — Z01.818 PRE-OP TESTING: ICD-10-CM

## 2025-07-28 DIAGNOSIS — Z01.818 PRE-OP TESTING: Primary | ICD-10-CM

## 2025-07-28 DIAGNOSIS — R09.89 OTHER SPECIFIED SYMPTOMS AND SIGNS INVOLVING THE CIRCULATORY AND RESPIRATORY SYSTEMS: ICD-10-CM

## 2025-08-01 ENCOUNTER — TELEPHONE (OUTPATIENT)
Dept: CARDIOLOGY CLINIC | Age: 70
End: 2025-08-01

## 2025-08-01 DIAGNOSIS — I42.8 NONISCHEMIC CARDIOMYOPATHY (HCC): Primary | ICD-10-CM

## 2025-08-01 DIAGNOSIS — Q25.43 AORTIC ROOT ANEURYSM: Primary | ICD-10-CM

## 2025-08-01 DIAGNOSIS — I42.8 NONISCHEMIC CARDIOMYOPATHY (HCC): ICD-10-CM

## 2025-08-01 DIAGNOSIS — E78.49 OTHER HYPERLIPIDEMIA: ICD-10-CM

## 2025-08-01 DIAGNOSIS — I77.810 AORTIC ROOT DILATATION: ICD-10-CM

## 2025-08-01 DIAGNOSIS — I10 HYPERTENSION, UNSPECIFIED TYPE: Primary | ICD-10-CM

## 2025-08-01 NOTE — TELEPHONE ENCOUNTER
Left Heart Cath Patient Pre-procedure Instructions    Do NOT eat or drink anything after midnight morning of procedure    MEDICATIONS:  Your medications have been reviewed. Please follow medication instructions below  It is okay to drink a sip of water with meds morning of procedure  Eliquis-do not take for 48 hours (2 days) prior to procedure    Transportation: Bring someone to drive you home.     Scheduling: Marion SANCHEZ is our full time procedure . She will call you to schedule your procedure.    Allergies: Do you have an allergy to dye or contrast? No.    Labs: We need to check blood work within 30 days of your procedure (CBC & BMP). Please go to any [facility] lab to get your labs drawn prior to your procedure.        Called to discuss with patient, no voicemail

## 2025-08-04 ENCOUNTER — TELEPHONE (OUTPATIENT)
Dept: CARDIOLOGY CLINIC | Age: 70
End: 2025-08-04

## 2025-08-05 PROBLEM — R06.02 SOB (SHORTNESS OF BREATH): Status: ACTIVE | Noted: 2025-08-01

## 2025-08-12 ENCOUNTER — HOSPITAL ENCOUNTER (OUTPATIENT)
Dept: VASCULAR LAB | Age: 70
Discharge: HOME | End: 2025-08-14
Attending: THORACIC SURGERY (CARDIOTHORACIC VASCULAR SURGERY)
Payer: MEDICARE

## 2025-08-12 ENCOUNTER — HOSPITAL ENCOUNTER (OUTPATIENT)
Dept: PULMONOLOGY | Age: 70
Discharge: HOME | End: 2025-08-12
Attending: THORACIC SURGERY (CARDIOTHORACIC VASCULAR SURGERY)
Payer: MEDICARE

## 2025-08-12 VITALS — OXYGEN SATURATION: 95 % | HEART RATE: 62 BPM | RESPIRATION RATE: 16 BRPM

## 2025-08-12 DIAGNOSIS — I71.21 ANEURYSM OF ASCENDING AORTA WITHOUT RUPTURE: ICD-10-CM

## 2025-08-12 DIAGNOSIS — I70.0 ATHEROSCLEROSIS OF AORTA: ICD-10-CM

## 2025-08-12 DIAGNOSIS — R09.89 OTHER SPECIFIED SYMPTOMS AND SIGNS INVOLVING THE CIRCULATORY AND RESPIRATORY SYSTEMS: ICD-10-CM

## 2025-08-12 DIAGNOSIS — Z01.818 PRE-OP TESTING: ICD-10-CM

## 2025-08-12 DIAGNOSIS — Q25.43 AORTIC ROOT ANEURYSM: ICD-10-CM

## 2025-08-12 LAB
BASE EXCESS BLDA CALC-SCNC: -1.1 MMOL/L (ref -3–3)
CO2 BLDA-SCNC: 39.5 MMOL/L
COHGB MFR BLDA: 0.1 % (ref 0–1.5)
DLCO %PRED: 82 %
DLCO PRED: NORMAL
DLCO/VA %PRED: NORMAL
DLCO/VA PRED: NORMAL
DLCO/VA: NORMAL
DLCO: NORMAL
EXPIRATORY TIME-POST: NORMAL
EXPIRATORY TIME: NORMAL
FEF 25-75 %CHNG: NORMAL
FEF 25-75 POST %PRED: NORMAL
FEF 25-75% %PRED-PRE: NORMAL
FEF 25-75% PRED: NORMAL
FEF 25-75-POST: NORMAL
FEF 25-75-PRE: NORMAL
FEV1 %PRED-POST: NORMAL
FEV1 %PRED-PRE: 90 %
FEV1 PRED: NORMAL
FEV1-POST: NORMAL
FEV1-PRE: NORMAL
FEV1/FVC %PRED-POST: NORMAL
FEV1/FVC %PRED-PRE: NORMAL
FEV1/FVC PRED: NORMAL
FEV1/FVC-POST: NORMAL
FEV1/FVC-PRE: 77 %
FVC %PRED-POST: NORMAL
FVC %PRED-PRE: NORMAL
FVC PRED: NORMAL
FVC-POST: NORMAL
FVC-PRE: NORMAL
GAW %PRED: NORMAL
GAW PRED: NORMAL
GAW: NORMAL
HCO3 BLDA-SCNC: 17.1 MMOL/L (ref 21–29)
HGB BLDA-MCNC: 13.1 G/DL (ref 12–16)
IC PRE %PRED: NORMAL
IC PRED: NORMAL
IC: NORMAL
MEP: NORMAL
METHGB MFR BLDA: 0.3 %
MIP: NORMAL
MVV %PRED-PRE: NORMAL
MVV PRED: NORMAL
MVV-PRE: NORMAL
O2 THERAPY: ABNORMAL
PCO2 BLDA: 30.6 MMHG (ref 35–45)
PEF %PRED-POST: NORMAL
PEF %PRED-PRE: NORMAL
PEF PRED: NORMAL
PEF%CHNG: NORMAL
PEF-POST: NORMAL
PEF-PRE: NORMAL
PH BLDA: 7.48 [PH] (ref 7.35–7.45)
PO2 BLDA: 137 MMHG (ref 75–108)
RAW %PRED: NORMAL
RAW PRED: NORMAL
RAW: NORMAL
RV PRE %PRED: NORMAL
RV PRED: NORMAL
RV: NORMAL
SAO2 % BLDA: 97.9 %
SVC %PRED: NORMAL
SVC PRED: NORMAL
SVC: NORMAL
TLC PRE %PRED: 95 %
TLC PRED: NORMAL
TLC: NORMAL
VA %PRED: NORMAL
VA PRED: NORMAL
VA: NORMAL
VAS LEFT ARM BP: 130 MMHG
VAS LEFT CCA DIST EDV: 8.4 CM/S
VAS LEFT CCA DIST PSV: 32.6 CM/S
VAS LEFT CCA MID EDV: 16.2 CM/S
VAS LEFT CCA MID PSV: 58.4 CM/S
VAS LEFT CCA PROX EDV: 19.7 CM/S
VAS LEFT CCA PROX PSV: 86 CM/S
VAS LEFT ECA EDV: 9.46 CM/S
VAS LEFT ECA PSV: 39.2 CM/S
VAS LEFT GSV AT KNEE DIAM: 2 MM
VAS LEFT GSV BK DIST DIAM: 2 MM
VAS LEFT GSV BK MID DIAM: 1.3 MM
VAS LEFT GSV BK PROX DIAM: 2.3 MM
VAS LEFT GSV JUNC DIAM: 7.1 MM
VAS LEFT GSV THIGH DIST DIAM: 2.9 MM
VAS LEFT GSV THIGH MID DIAM: 3.1 MM
VAS LEFT GSV THIGH PROX DIAM: 3.1 MM
VAS LEFT ICA DIST EDV: 17.5 CM/S
VAS LEFT ICA DIST PSV: 51.1 CM/S
VAS LEFT ICA MID EDV: 20.7 CM/S
VAS LEFT ICA MID PSV: 52.2 CM/S
VAS LEFT ICA PROX EDV: 18.6 CM/S
VAS LEFT ICA PROX PSV: 44.8 CM/S
VAS LEFT ICA/CCA PSV: 0.89
VAS LEFT SUBCLAVIAN PROX PSV: 73.7 CM/S
VAS LEFT VERTEBRAL EDV: 17 CM/S
VAS LEFT VERTEBRAL PSV: 44.5 CM/S
VAS RIGHT ARM BP: 124 MMHG
VAS RIGHT CCA DIST EDV: 10.9 CM/S
VAS RIGHT CCA DIST PSV: 31.9 CM/S
VAS RIGHT CCA MID EDV: 14.1 CM/S
VAS RIGHT CCA MID PSV: 48.2 CM/S
VAS RIGHT CCA PROX EDV: 11.4 CM/S
VAS RIGHT CCA PROX PSV: 46.6 CM/S
VAS RIGHT ECA EDV: 10.9 CM/S
VAS RIGHT ECA PSV: 49 CM/S
VAS RIGHT GSV AT KNEE DIAM: 2.2 MM
VAS RIGHT GSV BK DIST DIAM: 1.8 MM
VAS RIGHT GSV BK MID DIAM: 1.3 MM
VAS RIGHT GSV BK PROX DIAM: 1.5 MM
VAS RIGHT GSV JUNC DIAM: 4.9 MM
VAS RIGHT GSV THIGH DIST DIAM: 1.8 MM
VAS RIGHT GSV THIGH MID DIAM: 4.3 MM
VAS RIGHT GSV THIGH PROX DIAM: 3.8 MM
VAS RIGHT ICA DIST EDV: 29 CM/S
VAS RIGHT ICA DIST PSV: 70.7 CM/S
VAS RIGHT ICA MID EDV: 20.7 CM/S
VAS RIGHT ICA MID PSV: 46.4 CM/S
VAS RIGHT ICA PROX EDV: 9.8 CM/S
VAS RIGHT ICA PROX PSV: 24.1 CM/S
VAS RIGHT ICA/CCA PSV: 0.96
VAS RIGHT SUBCLAVIAN PROX PSV: 88 CM/S
VAS RIGHT VERTEBRAL EDV: 14.1 CM/S
VAS RIGHT VERTEBRAL PSV: 43.3 CM/S
VTG %PRED: NORMAL
VTG PRED: NORMAL
VTG: NORMAL

## 2025-08-12 PROCEDURE — 93971 EXTREMITY STUDY: CPT | Performed by: SURGERY

## 2025-08-12 PROCEDURE — 94010 BREATHING CAPACITY TEST: CPT

## 2025-08-12 PROCEDURE — 93880 EXTRACRANIAL BILAT STUDY: CPT

## 2025-08-12 PROCEDURE — 82803 BLOOD GASES ANY COMBINATION: CPT

## 2025-08-12 PROCEDURE — 93880 EXTRACRANIAL BILAT STUDY: CPT | Performed by: SURGERY

## 2025-08-12 PROCEDURE — 36600 WITHDRAWAL OF ARTERIAL BLOOD: CPT

## 2025-08-12 PROCEDURE — 94729 DIFFUSING CAPACITY: CPT

## 2025-08-12 PROCEDURE — 93970 EXTREMITY STUDY: CPT

## 2025-08-12 PROCEDURE — 94760 N-INVAS EAR/PLS OXIMETRY 1: CPT

## 2025-08-12 PROCEDURE — 36415 COLL VENOUS BLD VENIPUNCTURE: CPT

## 2025-08-12 PROCEDURE — 94726 PLETHYSMOGRAPHY LUNG VOLUMES: CPT

## 2025-08-12 RX ORDER — ALBUTEROL SULFATE 90 UG/1
4 INHALANT RESPIRATORY (INHALATION) ONCE
Status: CANCELLED | OUTPATIENT
Start: 2025-08-12

## 2025-08-12 ASSESSMENT — PULMONARY FUNCTION TESTS
FEV1/FVC_PRE: 77
FEV1_PERCENT_PREDICTED_PRE: 90

## 2025-08-13 PROBLEM — Q25.43 AORTIC ROOT ANEURYSM: Status: ACTIVE | Noted: 2025-08-13

## 2025-08-25 ENCOUNTER — HOSPITAL ENCOUNTER (OUTPATIENT)
Age: 70
Setting detail: OUTPATIENT SURGERY
Discharge: HOME | End: 2025-08-25
Attending: INTERNAL MEDICINE | Admitting: INTERNAL MEDICINE
Payer: MEDICARE

## 2025-08-25 VITALS
HEIGHT: 67 IN | TEMPERATURE: 98.1 F | BODY MASS INDEX: 31.71 KG/M2 | OXYGEN SATURATION: 99 % | WEIGHT: 202 LBS | RESPIRATION RATE: 20 BRPM | DIASTOLIC BLOOD PRESSURE: 54 MMHG | HEART RATE: 55 BPM | SYSTOLIC BLOOD PRESSURE: 105 MMHG

## 2025-08-25 DIAGNOSIS — R06.02 SOB (SHORTNESS OF BREATH): ICD-10-CM

## 2025-08-25 LAB
ANION GAP SERPL CALCULATED.3IONS-SCNC: 11 MMOL/L (ref 3–16)
BUN SERPL-MCNC: 13 MG/DL (ref 7–20)
CALCIUM SERPL-MCNC: 9.5 MG/DL (ref 8.3–10.6)
CHLORIDE SERPL-SCNC: 103 MMOL/L (ref 99–110)
CO2 SERPL-SCNC: 25 MMOL/L (ref 21–32)
CREAT SERPL-MCNC: 0.8 MG/DL (ref 0.6–1.2)
DEPRECATED RDW RBC AUTO: 12.9 % (ref 12.4–15.4)
ECHO BSA: 2.08 M2
EKG ATRIAL RATE: 54 BPM
EKG DIAGNOSIS: NORMAL
EKG P AXIS: 78 DEGREES
EKG P-R INTERVAL: 196 MS
EKG Q-T INTERVAL: 470 MS
EKG QRS DURATION: 88 MS
EKG QTC CALCULATION (BAZETT): 445 MS
EKG R AXIS: -35 DEGREES
EKG T AXIS: 21 DEGREES
EKG VENTRICULAR RATE: 54 BPM
GFR SERPLBLD CREATININE-BSD FMLA CKD-EPI: 80 ML/MIN/{1.73_M2}
GLUCOSE SERPL-MCNC: 103 MG/DL (ref 70–99)
HCT VFR BLD AUTO: 39.1 % (ref 36–48)
HGB BLD-MCNC: 13.4 G/DL (ref 12–16)
MCH RBC QN AUTO: 34.4 PG (ref 26–34)
MCHC RBC AUTO-ENTMCNC: 34.4 G/DL (ref 31–36)
MCV RBC AUTO: 100.1 FL (ref 80–100)
PLATELET # BLD AUTO: 217 K/UL (ref 135–450)
PMV BLD AUTO: 8.4 FL (ref 5–10.5)
POTASSIUM SERPL-SCNC: 3.8 MMOL/L (ref 3.5–5.1)
RBC # BLD AUTO: 3.91 M/UL (ref 4–5.2)
SODIUM SERPL-SCNC: 139 MMOL/L (ref 136–145)
WBC # BLD AUTO: 6.2 K/UL (ref 4–11)

## 2025-08-25 PROCEDURE — 7100000010 HC PHASE II RECOVERY - FIRST 15 MIN: Performed by: INTERNAL MEDICINE

## 2025-08-25 PROCEDURE — 6360000004 HC RX CONTRAST MEDICATION: Performed by: INTERNAL MEDICINE

## 2025-08-25 PROCEDURE — 6360000002 HC RX W HCPCS: Performed by: INTERNAL MEDICINE

## 2025-08-25 PROCEDURE — 99153 MOD SED SAME PHYS/QHP EA: CPT | Performed by: INTERNAL MEDICINE

## 2025-08-25 PROCEDURE — 93454 CORONARY ARTERY ANGIO S&I: CPT | Performed by: INTERNAL MEDICINE

## 2025-08-25 PROCEDURE — 2500000003 HC RX 250 WO HCPCS: Performed by: INTERNAL MEDICINE

## 2025-08-25 PROCEDURE — C1894 INTRO/SHEATH, NON-LASER: HCPCS | Performed by: INTERNAL MEDICINE

## 2025-08-25 PROCEDURE — 2709999900 HC NON-CHARGEABLE SUPPLY: Performed by: INTERNAL MEDICINE

## 2025-08-25 PROCEDURE — 99152 MOD SED SAME PHYS/QHP 5/>YRS: CPT | Performed by: INTERNAL MEDICINE

## 2025-08-25 PROCEDURE — 85027 COMPLETE CBC AUTOMATED: CPT

## 2025-08-25 PROCEDURE — 93010 ELECTROCARDIOGRAM REPORT: CPT | Performed by: INTERNAL MEDICINE

## 2025-08-25 PROCEDURE — 93005 ELECTROCARDIOGRAM TRACING: CPT | Performed by: INTERNAL MEDICINE

## 2025-08-25 PROCEDURE — 36415 COLL VENOUS BLD VENIPUNCTURE: CPT

## 2025-08-25 PROCEDURE — 7100000011 HC PHASE II RECOVERY - ADDTL 15 MIN: Performed by: INTERNAL MEDICINE

## 2025-08-25 PROCEDURE — 80048 BASIC METABOLIC PNL TOTAL CA: CPT

## 2025-08-25 PROCEDURE — C1769 GUIDE WIRE: HCPCS | Performed by: INTERNAL MEDICINE

## 2025-08-25 RX ORDER — SODIUM CHLORIDE 0.9 % (FLUSH) 0.9 %
5-40 SYRINGE (ML) INJECTION PRN
Status: DISCONTINUED | OUTPATIENT
Start: 2025-08-25 | End: 2025-08-25 | Stop reason: HOSPADM

## 2025-08-25 RX ORDER — SODIUM CHLORIDE 9 MG/ML
INJECTION, SOLUTION INTRAVENOUS PRN
Status: DISCONTINUED | OUTPATIENT
Start: 2025-08-25 | End: 2025-08-25 | Stop reason: HOSPADM

## 2025-08-25 RX ORDER — IOPAMIDOL 755 MG/ML
INJECTION, SOLUTION INTRAVASCULAR PRN
Status: DISCONTINUED | OUTPATIENT
Start: 2025-08-25 | End: 2025-08-25 | Stop reason: HOSPADM

## 2025-08-25 RX ORDER — SODIUM CHLORIDE 0.9 % (FLUSH) 0.9 %
5-40 SYRINGE (ML) INJECTION PRN
OUTPATIENT
Start: 2025-08-25

## 2025-08-25 RX ORDER — FENTANYL CITRATE 50 UG/ML
INJECTION, SOLUTION INTRAMUSCULAR; INTRAVENOUS PRN
Status: DISCONTINUED | OUTPATIENT
Start: 2025-08-25 | End: 2025-08-25 | Stop reason: HOSPADM

## 2025-08-25 RX ORDER — ACETAMINOPHEN 325 MG/1
650 TABLET ORAL EVERY 4 HOURS PRN
OUTPATIENT
Start: 2025-08-25

## 2025-08-25 RX ORDER — SODIUM CHLORIDE 0.9 % (FLUSH) 0.9 %
5-40 SYRINGE (ML) INJECTION EVERY 12 HOURS SCHEDULED
Status: DISCONTINUED | OUTPATIENT
Start: 2025-08-25 | End: 2025-08-25 | Stop reason: HOSPADM

## 2025-08-25 RX ORDER — SODIUM CHLORIDE 9 MG/ML
INJECTION, SOLUTION INTRAVENOUS PRN
OUTPATIENT
Start: 2025-08-25

## 2025-08-25 RX ORDER — MIDAZOLAM HYDROCHLORIDE 1 MG/ML
INJECTION, SOLUTION INTRAMUSCULAR; INTRAVENOUS PRN
Status: DISCONTINUED | OUTPATIENT
Start: 2025-08-25 | End: 2025-08-25 | Stop reason: HOSPADM

## 2025-08-25 RX ORDER — SODIUM CHLORIDE 0.9 % (FLUSH) 0.9 %
5-40 SYRINGE (ML) INJECTION EVERY 12 HOURS SCHEDULED
OUTPATIENT
Start: 2025-08-25

## 2025-08-25 RX ORDER — SODIUM CHLORIDE 9 MG/ML
INJECTION, SOLUTION INTRAVENOUS CONTINUOUS
OUTPATIENT
Start: 2025-08-25

## 2025-08-25 ASSESSMENT — PAIN SCALES - GENERAL
PAINLEVEL_OUTOF10: 0
PAINLEVEL_OUTOF10: 0

## 2025-08-27 ENCOUNTER — TELEPHONE (OUTPATIENT)
Age: 70
End: 2025-08-27

## 2025-09-05 ENCOUNTER — OFFICE VISIT (OUTPATIENT)
Age: 70
End: 2025-09-05
Payer: MEDICARE

## 2025-09-05 VITALS
WEIGHT: 207.6 LBS | HEIGHT: 67 IN | HEART RATE: 52 BPM | BODY MASS INDEX: 32.58 KG/M2 | DIASTOLIC BLOOD PRESSURE: 70 MMHG | OXYGEN SATURATION: 92 % | TEMPERATURE: 97.1 F | SYSTOLIC BLOOD PRESSURE: 118 MMHG

## 2025-09-05 DIAGNOSIS — Q25.43 AORTIC ROOT ANEURYSM: Primary | ICD-10-CM

## 2025-09-05 PROCEDURE — 1123F ACP DISCUSS/DSCN MKR DOCD: CPT | Performed by: THORACIC SURGERY (CARDIOTHORACIC VASCULAR SURGERY)

## 2025-09-05 PROCEDURE — 99214 OFFICE O/P EST MOD 30 MIN: CPT | Performed by: THORACIC SURGERY (CARDIOTHORACIC VASCULAR SURGERY)

## 2025-09-05 PROCEDURE — 3078F DIAST BP <80 MM HG: CPT | Performed by: THORACIC SURGERY (CARDIOTHORACIC VASCULAR SURGERY)

## 2025-09-05 PROCEDURE — 3074F SYST BP LT 130 MM HG: CPT | Performed by: THORACIC SURGERY (CARDIOTHORACIC VASCULAR SURGERY)

## 2025-09-05 PROCEDURE — 1159F MED LIST DOCD IN RCRD: CPT | Performed by: THORACIC SURGERY (CARDIOTHORACIC VASCULAR SURGERY)

## (undated) DEVICE — Device

## (undated) DEVICE — DRAPE BRACH ANGIO W38XL44IN POLYPR SMS FAB

## (undated) DEVICE — SHEATH INTRO 6FR L10CM DIL 0.021IN PLAS SHT ANG GWIRE L45CM

## (undated) DEVICE — KIT AT-X65 ANGIOTOUCH HAND CONTROLLER

## (undated) DEVICE — CATHETER ANGIO 5FR L110CM COR NYL POLYUR S STL JACKY RAD

## (undated) DEVICE — CANNULA NSL W/ O2 DEL AD 4 M

## (undated) DEVICE — CATHETER ANGIO INFIN 038IN AMPLATZ 534545T

## (undated) DEVICE — CATHETER DIAG L100CM DIA5.2FR 0.038IN POLYUR COR JR4 STD

## (undated) DEVICE — DEVICE COMPR L24CM REG RAD W/ INFL TR BND

## (undated) DEVICE — GUIDEWIRE VASC L260CM DIA0.035IN RAD 3MM J TIP L7CM PTFE

## (undated) DEVICE — PAD DEFIB AD RADIOTRANS PHY

## (undated) DEVICE — CATHETER ANGIO 5FR L100CM GRY S STL NYL JL3.5 3 SEG BRAID L